# Patient Record
Sex: MALE | Race: WHITE | Employment: OTHER | URBAN - METROPOLITAN AREA
[De-identification: names, ages, dates, MRNs, and addresses within clinical notes are randomized per-mention and may not be internally consistent; named-entity substitution may affect disease eponyms.]

---

## 2019-07-09 ENCOUNTER — OFFICE VISIT (OUTPATIENT)
Dept: URGENT CARE | Facility: URGENT CARE | Age: 84
End: 2019-07-09
Payer: COMMERCIAL

## 2019-07-09 ENCOUNTER — ANCILLARY PROCEDURE (OUTPATIENT)
Dept: GENERAL RADIOLOGY | Facility: CLINIC | Age: 84
End: 2019-07-09
Attending: INTERNAL MEDICINE
Payer: COMMERCIAL

## 2019-07-09 VITALS
DIASTOLIC BLOOD PRESSURE: 85 MMHG | OXYGEN SATURATION: 94 % | WEIGHT: 195.2 LBS | SYSTOLIC BLOOD PRESSURE: 161 MMHG | HEART RATE: 77 BPM | TEMPERATURE: 97.8 F

## 2019-07-09 DIAGNOSIS — R06.02 SOB (SHORTNESS OF BREATH): ICD-10-CM

## 2019-07-09 DIAGNOSIS — R06.02 SOB (SHORTNESS OF BREATH): Primary | ICD-10-CM

## 2019-07-09 LAB
ANION GAP SERPL CALCULATED.3IONS-SCNC: 6 MMOL/L (ref 3–14)
BUN SERPL-MCNC: 15 MG/DL (ref 7–30)
CALCIUM SERPL-MCNC: 9.1 MG/DL (ref 8.5–10.1)
CHLORIDE SERPL-SCNC: 100 MMOL/L (ref 94–109)
CO2 SERPL-SCNC: 28 MMOL/L (ref 20–32)
CREAT SERPL-MCNC: 0.85 MG/DL (ref 0.66–1.25)
GFR SERPL CREATININE-BSD FRML MDRD: 81 ML/MIN/{1.73_M2}
GLUCOSE SERPL-MCNC: 93 MG/DL (ref 70–99)
HGB BLD-MCNC: 14.8 G/DL (ref 13.3–17.7)
POTASSIUM SERPL-SCNC: 4.2 MMOL/L (ref 3.4–5.3)
SODIUM SERPL-SCNC: 134 MMOL/L (ref 133–144)

## 2019-07-09 PROCEDURE — 93000 ELECTROCARDIOGRAM COMPLETE: CPT | Performed by: INTERNAL MEDICINE

## 2019-07-09 PROCEDURE — 99203 OFFICE O/P NEW LOW 30 MIN: CPT | Performed by: INTERNAL MEDICINE

## 2019-07-09 PROCEDURE — 36415 COLL VENOUS BLD VENIPUNCTURE: CPT | Performed by: INTERNAL MEDICINE

## 2019-07-09 PROCEDURE — 85018 HEMOGLOBIN: CPT | Performed by: INTERNAL MEDICINE

## 2019-07-09 PROCEDURE — 80048 BASIC METABOLIC PNL TOTAL CA: CPT | Performed by: INTERNAL MEDICINE

## 2019-07-09 PROCEDURE — 71046 X-RAY EXAM CHEST 2 VIEWS: CPT

## 2019-07-09 NOTE — PROGRESS NOTES
"SUBJECTIVE:  Eduardo Cowan, a 83 year old male, presents for evaluation of shortness of breath.  Duration of symptoms: 3 week(s).  This has been relatively stable over that period of time.  He has a history of hereditary peripheral neuropathy which has cuased some disability with walking and gait.  He states that his functional status has also been relatively stable over the past several months.  Just stating that he \"tires out\" more easily.  If there is a change in exercise capacity, it has been gradual and small over the past 6 months or so. He is describing also some right-sided chest discomfort which seems to be related to moving the right upper extremity. This is mild. Denies palpitations.  Just a little chronic cough.  Denies stridor or wheeze.  Also describing some vague right lower abdominal discomfort which was self-resolving.    ROS: positive for some dysphagia (pharyngeal); positive for diffuse arthralgias and arthritis.    SOCH: PCP through [facility] in [facility]     PMH: states that he recently had MRI abdomen (only finding was a benign liver lesion).   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 in terms of lower extremity function in particular; HTN    ROS:  The following systems have been completely reviewed and are negative except as noted in the HPI: CONSTITUTIONAL, HEAD AND NECK, CARDIOVASCULAR, PULMONARY, GASTROINTESTINAL, RENAL, HEMATOLOGIC, MUSCULOSKELETAL and NEUROLOGIC    OBJECTIVE:  /85 (BP Location: Left arm, Patient Position: Chair, Cuff Size: Adult Regular)   Pulse 77   Temp 97.8  F (36.6  C) (Tympanic)   Wt 88.5 kg (195 lb 3.2 oz)   SpO2 94%   GENERAL: alert and interactive elderly male; speaking full sentences; no overt respiratory distress  HENT: ear canals and TM's normal and nose and mouth without ulcers or lesions  NECK: no adenopathy, no asymmetry, masses, or scars and thyroid normal to palpation  RESP: clear to auscultation and " percussion bilaterally; diminished fremitus in the lung bases bilaterally  CV: regular rates and rhythm, normal S1 S2, no S3 or S4 and no murmur, click or rub -  ABDOMEN: soft, nontender, without hepatosplenomegaly or masses and bowel sounds normal  EXT: no cyanosis, clubbing or edema; peripheral pulses are brisk and symmetric in the radials, dorsalis pedis and posterior tibials bilaterally     CXR, which I have personally reviewed and interpreted, shows an elevated right hemidiaphragm of unknown chronicity.  No infiltrate, effusion, mediastinal mass.  Normal cardiac silhouette.    EKG, which I have personally reviewed and interpreted, shows a first degree AV block and RBBB of unknown chronicity.  This is different from EKG obtained > 15 years ago in 2003.    LAB: Normal BMP and Hgb    ASSESSMENT/PLAN:    ICD-10-CM    1. SOB (shortness of breath) R06.02 XR Chest 2 Views     EKG 12-lead complete w/read - Clinics     Basic metabolic panel  (Ca, Cl, CO2, Creat, Gluc, K, Na, BUN)     Hemoglobin    Considered extensive differential diagnosis with focus on excluding acute and imminently threatening causes of dyspnea such as cardiac arrhythmia, myocardial ischemia, CHF, pulmonary embolus, obstructive lung disease, pneumonia, acute neuromuscular respiratory failure.  None of the above are suggested by the history and the supplemental imaging, lab and EKG are all reassuring.  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  The elevated right hemidiaphragm on CXR may go along with this.  At the present time, he is referred back to his PCP for further evaluation.  Recommended investigations include looking at historic thoracic imaging (if any) to exclude mediastinal processes that could be impairing the phrenic nerve and to look at pulmonary function testing to quantify any restrictive pulmonary physiology  that may be present.       Wally Alberts MD

## 2019-07-10 NOTE — PATIENT INSTRUCTIONS
I am confident that we have excluded acute and imminently threatening causes of your shortness of breath.  There are a few findings today that should be followed up with your PCP:  1. Right bundle branch block on the EKG: this is new relative to a tracing we had from 2003 but it could have happened years ago without you having any knowledge of it and is not clearly related to your symptoms.  I would want to compare to recent EKGs.  2. Elevated diaphragm on the right: again, not sure if this connects to your symptoms though it could.   Would want to look at recent chest imaging and potentially consider other tests.  3. The other test that may be helpful for your PCP to consider is a pulmonary function test that measures the function of your respiratory muscles and can help to explain your symptoms potentially as being related to the neuropathy affecting respiratory muscle function.

## 2020-07-31 ENCOUNTER — RECORDS - HEALTHEAST (OUTPATIENT)
Dept: LAB | Facility: CLINIC | Age: 85
End: 2020-07-31

## 2020-08-10 LAB
SARS-COV-2 BY NAA - HISTORICAL: NOT DETECTED
SARS-COV-2 SOURCE - HISTORICAL: NORMAL

## 2020-08-24 ENCOUNTER — RECORDS - HEALTHEAST (OUTPATIENT)
Dept: LAB | Facility: CLINIC | Age: 85
End: 2020-08-24

## 2020-08-28 LAB
SARS-COV-2 BY NAA - HISTORICAL: NOT DETECTED
SARS-COV-2 SOURCE - HISTORICAL: NORMAL

## 2020-09-04 ENCOUNTER — RECORDS - HEALTHEAST (OUTPATIENT)
Dept: LAB | Facility: CLINIC | Age: 85
End: 2020-09-04

## 2020-09-11 LAB
SARS-COV-2 BY NAA - HISTORICAL: NOT DETECTED
SARS-COV-2 SOURCE - HISTORICAL: NORMAL

## 2020-12-30 ENCOUNTER — RECORDS - HEALTHEAST (OUTPATIENT)
Dept: LAB | Facility: CLINIC | Age: 85
End: 2020-12-30

## 2020-12-30 LAB
SARS-COV-2 PCR COMMENT: NORMAL
SARS-COV-2 RNA SPEC QL NAA+PROBE: NEGATIVE
SARS-COV-2 VIRUS SPECIMEN SOURCE: NORMAL

## 2021-01-06 ENCOUNTER — RECORDS - HEALTHEAST (OUTPATIENT)
Dept: LAB | Facility: CLINIC | Age: 86
End: 2021-01-06

## 2021-02-03 ENCOUNTER — RECORDS - HEALTHEAST (OUTPATIENT)
Dept: LAB | Facility: CLINIC | Age: 86
End: 2021-02-03

## 2021-10-31 ENCOUNTER — LAB REQUISITION (OUTPATIENT)
Dept: LAB | Facility: CLINIC | Age: 86
End: 2021-10-31
Payer: COMMERCIAL

## 2021-10-31 DIAGNOSIS — E11.9 TYPE 2 DIABETES MELLITUS WITHOUT COMPLICATIONS (H): ICD-10-CM

## 2021-10-31 DIAGNOSIS — G31.84 MILD COGNITIVE IMPAIRMENT, SO STATED: ICD-10-CM

## 2021-10-31 DIAGNOSIS — Z98.890 OTHER SPECIFIED POSTPROCEDURAL STATES: ICD-10-CM

## 2021-10-31 DIAGNOSIS — E78.5 HYPERLIPIDEMIA, UNSPECIFIED: ICD-10-CM

## 2021-10-31 DIAGNOSIS — E03.9 HYPOTHYROIDISM, UNSPECIFIED: ICD-10-CM

## 2021-10-31 DIAGNOSIS — D47.2 MONOCLONAL GAMMOPATHY: ICD-10-CM

## 2021-10-31 DIAGNOSIS — G62.9 POLYNEUROPATHY, UNSPECIFIED: ICD-10-CM

## 2021-10-31 DIAGNOSIS — F32.4 MAJOR DEPRESSIVE DISORDER, SINGLE EPISODE, IN PARTIAL REMISSION (H): ICD-10-CM

## 2021-10-31 DIAGNOSIS — I10 ESSENTIAL (PRIMARY) HYPERTENSION: ICD-10-CM

## 2021-11-02 LAB
ALBUMIN SERPL-MCNC: 3.7 G/DL (ref 3.5–5)
ALP SERPL-CCNC: 64 U/L (ref 45–120)
ALT SERPL W P-5'-P-CCNC: 60 U/L (ref 0–45)
ANION GAP SERPL CALCULATED.3IONS-SCNC: 8 MMOL/L (ref 5–18)
AST SERPL W P-5'-P-CCNC: 50 U/L (ref 0–40)
BILIRUB SERPL-MCNC: 0.7 MG/DL (ref 0–1)
BUN SERPL-MCNC: 29 MG/DL (ref 8–28)
CALCIUM SERPL-MCNC: 10 MG/DL (ref 8.5–10.5)
CHLORIDE BLD-SCNC: 103 MMOL/L (ref 98–107)
CHOLEST SERPL-MCNC: 116 MG/DL
CO2 SERPL-SCNC: 28 MMOL/L (ref 22–31)
CREAT SERPL-MCNC: 1.01 MG/DL (ref 0.7–1.3)
ERYTHROCYTE [DISTWIDTH] IN BLOOD BY AUTOMATED COUNT: 14.6 % (ref 10–15)
FASTING STATUS PATIENT QL REPORTED: ABNORMAL
FOLATE SERPL-MCNC: 5.9 NG/ML
GFR SERPL CREATININE-BSD FRML MDRD: 68 ML/MIN/1.73M2
GLUCOSE BLD-MCNC: 112 MG/DL (ref 70–125)
HCT VFR BLD AUTO: 41.9 % (ref 40–53)
HDLC SERPL-MCNC: 35 MG/DL
HGB BLD-MCNC: 13.3 G/DL (ref 13.3–17.7)
LDLC SERPL CALC-MCNC: 40 MG/DL
MCH RBC QN AUTO: 29.4 PG (ref 26.5–33)
MCHC RBC AUTO-ENTMCNC: 31.7 G/DL (ref 31.5–36.5)
MCV RBC AUTO: 93 FL (ref 78–100)
PLATELET # BLD AUTO: 112 10E3/UL (ref 150–450)
POTASSIUM BLD-SCNC: 4.3 MMOL/L (ref 3.5–5)
PROT SERPL-MCNC: 8.4 G/DL (ref 6–8)
PSA SERPL-MCNC: 0.46 UG/L (ref 0–6.5)
RBC # BLD AUTO: 4.52 10E6/UL (ref 4.4–5.9)
SODIUM SERPL-SCNC: 139 MMOL/L (ref 136–145)
TRIGL SERPL-MCNC: 205 MG/DL
TSH SERPL DL<=0.005 MIU/L-ACNC: 1.93 UIU/ML (ref 0.3–5)
VIT B12 SERPL-MCNC: 193 PG/ML (ref 213–816)
WBC # BLD AUTO: 3.4 10E3/UL (ref 4–11)

## 2021-11-02 PROCEDURE — 36415 COLL VENOUS BLD VENIPUNCTURE: CPT | Mod: ORL | Performed by: FAMILY MEDICINE

## 2021-11-02 PROCEDURE — 80061 LIPID PANEL: CPT | Mod: ORL | Performed by: FAMILY MEDICINE

## 2021-11-02 PROCEDURE — 84165 PROTEIN E-PHORESIS SERUM: CPT | Mod: 26 | Performed by: PATHOLOGY

## 2021-11-02 PROCEDURE — G0103 PSA SCREENING: HCPCS | Mod: ORL | Performed by: FAMILY MEDICINE

## 2021-11-02 PROCEDURE — P9603 ONE-WAY ALLOW PRORATED MILES: HCPCS | Mod: ORL | Performed by: FAMILY MEDICINE

## 2021-11-02 PROCEDURE — 82607 VITAMIN B-12: CPT | Mod: ORL | Performed by: FAMILY MEDICINE

## 2021-11-02 PROCEDURE — 84443 ASSAY THYROID STIM HORMONE: CPT | Mod: ORL | Performed by: FAMILY MEDICINE

## 2021-11-02 PROCEDURE — 82746 ASSAY OF FOLIC ACID SERUM: CPT | Mod: ORL | Performed by: FAMILY MEDICINE

## 2021-11-02 PROCEDURE — 82306 VITAMIN D 25 HYDROXY: CPT | Mod: ORL | Performed by: FAMILY MEDICINE

## 2021-11-02 PROCEDURE — 80053 COMPREHEN METABOLIC PANEL: CPT | Mod: ORL | Performed by: FAMILY MEDICINE

## 2021-11-02 PROCEDURE — 84165 PROTEIN E-PHORESIS SERUM: CPT | Mod: TC,ORL | Performed by: FAMILY MEDICINE

## 2021-11-02 PROCEDURE — 85027 COMPLETE CBC AUTOMATED: CPT | Mod: ORL | Performed by: FAMILY MEDICINE

## 2021-11-03 LAB
DEPRECATED CALCIDIOL+CALCIFEROL SERPL-MC: 17 UG/L (ref 30–80)
TOTAL PROTEIN SERUM FOR ELP: 8.4 G/DL (ref 6–8)

## 2021-11-04 LAB
ALBUMIN PERCENT: 55.1 % (ref 51–67)
ALBUMIN SERPL ELPH-MCNC: 4.6 G/DL (ref 3.2–4.7)
ALPHA 1 PERCENT: 2.3 % (ref 2–4)
ALPHA 2 PERCENT: 9.9 % (ref 5–13)
ALPHA1 GLOB SERPL ELPH-MCNC: 0.2 G/DL (ref 0.1–0.3)
ALPHA2 GLOB SERPL ELPH-MCNC: 0.8 G/DL (ref 0.4–0.9)
B-GLOBULIN SERPL ELPH-MCNC: 0.9 G/DL (ref 0.7–1.2)
BETA PERCENT: 10.2 % (ref 10–17)
GAMMA GLOB SERPL ELPH-MCNC: 1.9 G/DL (ref 0.6–1.4)
GAMMA GLOBULIN PERCENT: 22.5 % (ref 9–20)
MONOCLONAL PEAK: 1.5 G/DL
PATH ICD:: ABNORMAL
PROT PATTERN SERPL ELPH-IMP: ABNORMAL
REVIEWING PATHOLOGIST: ABNORMAL
TOTAL PROTEIN SERUM FOR ELP (SYNCED VALUE): 8.4 G/DL

## 2022-02-28 ENCOUNTER — DOCUMENTATION ONLY (OUTPATIENT)
Dept: OTHER | Facility: CLINIC | Age: 87
End: 2022-02-28
Payer: COMMERCIAL

## 2022-03-01 ENCOUNTER — PATIENT OUTREACH (OUTPATIENT)
Dept: GERIATRIC MEDICINE | Facility: CLINIC | Age: 87
End: 2022-03-01

## 2022-03-10 ENCOUNTER — DOCUMENTATION ONLY (OUTPATIENT)
Dept: GERIATRICS | Facility: CLINIC | Age: 87
End: 2022-03-10
Payer: COMMERCIAL

## 2022-03-10 DIAGNOSIS — G47.00 INSOMNIA: ICD-10-CM

## 2022-03-10 DIAGNOSIS — E55.9 VITAMIN D DEFICIENCY: ICD-10-CM

## 2022-03-10 DIAGNOSIS — E53.8 VITAMIN B12 DEFICIENCY (NON ANEMIC): ICD-10-CM

## 2022-03-10 DIAGNOSIS — F32.9 MAJOR DEPRESSION: ICD-10-CM

## 2022-03-10 DIAGNOSIS — K59.00 CONSTIPATION: ICD-10-CM

## 2022-03-10 DIAGNOSIS — E03.9 HYPOTHYROIDISM: ICD-10-CM

## 2022-03-10 DIAGNOSIS — E78.5 HYPERLIPIDEMIA: ICD-10-CM

## 2022-03-10 DIAGNOSIS — R52 PAIN: ICD-10-CM

## 2022-03-10 DIAGNOSIS — I10 ESSENTIAL HYPERTENSION: Primary | ICD-10-CM

## 2022-03-10 RX ORDER — PREGABALIN 200 MG/1
200 CAPSULE ORAL 3 TIMES DAILY
COMMUNITY
End: 2022-07-15

## 2022-03-10 RX ORDER — LANOLIN ALCOHOL/MO/W.PET/CERES
1000 CREAM (GRAM) TOPICAL DAILY
Start: 2022-03-10 | End: 2022-05-16

## 2022-03-10 RX ORDER — LANOLIN ALCOHOL/MO/W.PET/CERES
3 CREAM (GRAM) TOPICAL AT BEDTIME
Start: 2022-03-10

## 2022-03-10 RX ORDER — LOSARTAN POTASSIUM 25 MG/1
25 TABLET ORAL DAILY
Start: 2022-03-10

## 2022-03-10 RX ORDER — SERTRALINE HYDROCHLORIDE 100 MG/1
100 TABLET, FILM COATED ORAL DAILY
Start: 2022-03-10 | End: 2022-06-13

## 2022-03-10 RX ORDER — SIMVASTATIN 40 MG
40 TABLET ORAL AT BEDTIME
Start: 2022-03-10

## 2022-03-10 RX ORDER — ASPIRIN 81 MG/1
81 TABLET, CHEWABLE ORAL DAILY
Start: 2022-03-10

## 2022-03-10 RX ORDER — ACETAMINOPHEN 500 MG
1000 TABLET ORAL 2 TIMES DAILY
Start: 2022-03-10 | End: 2022-04-25

## 2022-03-10 RX ORDER — LEVOTHYROXINE SODIUM 75 UG/1
75 TABLET ORAL DAILY
Start: 2022-03-10

## 2022-03-10 RX ORDER — POLYETHYLENE GLYCOL 3350 17 G/17G
1 POWDER, FOR SOLUTION ORAL DAILY PRN
Start: 2022-03-10

## 2022-03-15 VITALS
HEART RATE: 78 BPM | RESPIRATION RATE: 18 BRPM | WEIGHT: 183.6 LBS | TEMPERATURE: 97.6 F | DIASTOLIC BLOOD PRESSURE: 86 MMHG | SYSTOLIC BLOOD PRESSURE: 144 MMHG

## 2022-03-16 ENCOUNTER — ASSISTED LIVING VISIT (OUTPATIENT)
Dept: GERIATRICS | Facility: CLINIC | Age: 87
End: 2022-03-16
Payer: COMMERCIAL

## 2022-03-16 DIAGNOSIS — E78.5 HYPERLIPIDEMIA, UNSPECIFIED HYPERLIPIDEMIA TYPE: ICD-10-CM

## 2022-03-16 DIAGNOSIS — Z71.89 ADVANCED DIRECTIVES, COUNSELING/DISCUSSION: ICD-10-CM

## 2022-03-16 DIAGNOSIS — K21.9 GASTROESOPHAGEAL REFLUX DISEASE WITHOUT ESOPHAGITIS: ICD-10-CM

## 2022-03-16 DIAGNOSIS — F32.A DEPRESSION, UNSPECIFIED DEPRESSION TYPE: ICD-10-CM

## 2022-03-16 DIAGNOSIS — E53.8 VITAMIN B12 DEFICIENCY (NON ANEMIC): ICD-10-CM

## 2022-03-16 DIAGNOSIS — L85.3 DRY SKIN: ICD-10-CM

## 2022-03-16 DIAGNOSIS — Z85.820 HISTORY OF MELANOMA: ICD-10-CM

## 2022-03-16 DIAGNOSIS — E11.51 TYPE 2 DIABETES MELLITUS WITH PERIPHERAL ANGIOPATHY (H): ICD-10-CM

## 2022-03-16 DIAGNOSIS — F03.90 DEMENTIA WITHOUT BEHAVIORAL DISTURBANCE, UNSPECIFIED DEMENTIA TYPE: Primary | ICD-10-CM

## 2022-03-16 DIAGNOSIS — M54.16 LUMBAR RADICULOPATHY: ICD-10-CM

## 2022-03-16 DIAGNOSIS — M15.9 OSTEOARTHRITIS OF MULTIPLE JOINTS, UNSPECIFIED OSTEOARTHRITIS TYPE: ICD-10-CM

## 2022-03-16 DIAGNOSIS — D47.2 MGUS (MONOCLONAL GAMMOPATHY OF UNKNOWN SIGNIFICANCE): ICD-10-CM

## 2022-03-16 DIAGNOSIS — I10 PRIMARY HYPERTENSION: ICD-10-CM

## 2022-03-16 DIAGNOSIS — E11.42 DIABETIC POLYNEUROPATHY ASSOCIATED WITH TYPE 2 DIABETES MELLITUS (H): ICD-10-CM

## 2022-03-16 DIAGNOSIS — K57.32 DIVERTICULITIS OF COLON: ICD-10-CM

## 2022-03-16 DIAGNOSIS — E55.9 VITAMIN D DEFICIENCY: ICD-10-CM

## 2022-03-16 DIAGNOSIS — E03.9 ACQUIRED HYPOTHYROIDISM: ICD-10-CM

## 2022-03-16 RX ORDER — LANOLIN ALCOHOL/MO/W.PET/CERES
CREAM (GRAM) TOPICAL
Qty: 480 ML | Refills: 11 | Status: SHIPPED | OUTPATIENT
Start: 2022-03-16

## 2022-03-16 NOTE — PROGRESS NOTES
"[facility] GERIATRICS    PRIMARY CARE PROVIDER AND CLINIC:  Kali Cronin, APRN CNP, [address] / [place] [address]  Chief Complaint   Patient presents with     [facility] Medical Record Number:  [id number]  Place of Service where encounter took place:  [facility] ([facility] [269]    Eduardo Cowan  is a 86 year old  (1935), living in above facility since 6/21/2019 and now choosing to change PCPs to [facility].  Previously followed by [facility] and their records are not available.     HPI:    He has a medical history significant for mild dementia, diabetes type 2 with neuropathy, HTN, lumbar radiculopathy, osteoarthritis, GERD, depression.   He lives in Memory Care due to high level physical needs. Nonambulatory for ~ 5 yrs and uses an electric scooter. Arabella is used for transfers or he pivots with max assist. Requires assist of 1 with cares. His grandson is present today.     He reports feeling well. Reports mild congestion with occasional dry cough for the past month. No shortness of breath and does not feel ill. Reports \"constant\" pain from left sciatica and neuropathy of both feet. Nurses are concerned about pressure areas on his feet and toes. Skin intact. Mood is good, sleeping well.     CODE STATUS/ADVANCE DIRECTIVES DISCUSSION:  No CPR- Do NOT Intubate  DNR / DNI   ALLERGIES:   Allergies   Allergen Reactions     Amlodipine Besylate      swelling of feet      PAST MEDICAL HISTORY:   Past Medical History:   Diagnosis Date     Acquired hypothyroidism      Acute duodenal ulcer with hemorrhage, without mention of obstruction 01/01/1982    Bleeding duodenal ulcer     Cataract      Diverticulitis of colon      Elevated prostate specific antigen (PSA)     Has had benign biopsies.  Was 10.8 in 2-02.     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  FAMILY HISTORY: family history includes Cancer in his mother.  SOCIAL HISTORY:   reports that he quit smoking about 55 years ago. His smoking use included cigarettes. He has never used smokeless tobacco. He reports previous alcohol use.  Patient's living condition: lives in an assisted living facility.  for 3 yrs. 1 son and 1 daughter.     Post Discharge Medication Reconciliation Status: discharge medications reconciled, continue medications without change  Current Outpatient Medications   Medication Sig     acetaminophen (TYLENOL) 500 MG tablet Take 2 tablets (1,000 mg) by mouth 2 times daily     aspirin (ASA) 81 MG chewable tablet Take 1 tablet (81 mg) by mouth daily     cyanocobalamin (VITAMIN B-12) 1000 MCG tablet Take 1 tablet (1,000 mcg) by mouth daily     Eucerin external lotion To both LE and feet at HS and daily prn dry skin     levothyroxine (SYNTHROID/LEVOTHROID) 75 MCG tablet Take 1 tablet (75 mcg) by mouth daily     losartan (COZAAR) 25 MG tablet Take 1 tablet (25 mg) by mouth daily     melatonin 3 MG tablet Take 1 tablet (3 mg) by mouth At Bedtime     polyethylene glycol (MIRALAX) 17 GM/Dose powder Take 17 g (1 capful) by mouth daily as needed for constipation     Pregabalin (LYRICA) 200 MG capsule Take 200 mg by mouth 3 times daily     sertraline (ZOLOFT) 100 MG tablet Take 1 tablet (100 mg) by mouth daily      simvastatin (ZOCOR) 40 MG tablet Take 1 tablet (40 mg) by mouth At Bedtime     Vitamin D3 (CHOLECALCIFEROL) 125 MCG (5000 UT) tablet Take 1 tablet (125 mcg) by mouth daily     No current facility-administered medications for this visit.       ROS:  10 point ROS of systems including Constitutional, Eyes, Respiratory, Cardiovascular, Gastroenterology, Genitourinary, Integumentary, Musculoskeletal, Psychiatric were all negative except for pertinent positives noted in my HPI.    Vitals:  BP (!) 144/86   Pulse 78   Temp 97.6  F (36.4  C)   Resp 18   Wt 83.3 kg (183 lb 9.6 oz)   Exam:  GENERAL APPEARANCE:  Alert, in no distress  ENT:  [place], oropharynx clear  EYES:  EOM normal, conjunctiva and lids normal  NECK: no adenopathy,masses or thyromegaly  RESP:  lungs clear to auscultation , no respiratory distress  CV:  regular rate and rhythm, no murmur,  +2 pedal pulses, peripheral edema trace in both LE  ABDOMEN:  soft, non-tender, no distension, no masses  M/S:   up in recliner. Mild weakness both LE. Good upper body strength. No joint inflammation  SKIN:  dry, cracked skin of both feet and LE. Erythematous areas over toes of both feet, no open areas  PSYCH:  oriented X 3, memory impaired , affect and mood normal    Lab/Diagnostic data:  Recent labs in [facility] reviewed by me today.     ASSESSMENT / PLAN:  (F03.90) Dementia without behavioral disturbance, unspecified dementia type (H)  (primary encounter diagnosis)  Comment: noted in records 9/2021. Appears to have mild deficits. Conversation is appropriate. Poor functional status due to comorbidities   Plan: Memory Care staff assistance with cares, meals,activities and med admin.     (E11.51) Type 2 diabetes mellitus with peripheral angiopathy (H)  (E11.42) Diabetic polyneuropathy associated with type 2 diabetes mellitus (H)  (L85.3) Dry skin  Comment: diet controlled with HgbA1c 6.5 on 8/6/2021.Skin both LE very dry. Pressure areas of toes appear to be from his slippers  He  was evaluated at [place] for neuropathy, felt to be genetic and/or  idiopathic.   Plan: continue Lyrica for neuropathy. Eucerin to feet and LE bid and prn. Closely monitor skin for breakdown as wounds will be difficult to heal. He will talk to family about getting new slippers. Monitor blood glucose prn, follow HgbA1c.     (I10) Primary hypertension  Comment: controlled with recent  BPs: 144/86, 128/74, 115/69, 157/96  HR in the 70-80s   Creatinine 1.01 with GFR 68  Plan: continue losartan 25 mg daily. Monitor VS and adjust dose as indicated. BMP    (M54.16) Lumbar radiculopathy  (M15.9) Osteoarthritis of multiple joints, unspecified osteoarthritis type  Comment: pain fairly well managed. Nonambulatory  Plan: continue tylenol, Lyrica for neuropathy     (K21.9) Gastroesophageal reflux disease without esophagitis  Comment: not currently requiring treatment   Plan: follow symptoms     (K57.32) Diverticulitis of colon  Comment: no acute issues  Plan: miralax prn     (E03.9) Acquired hypothyroidism  Comment: TSH 1.93 on 11/2/2021  Plan: continue levothyroxine 75 mcg daily. Yearly TSH    (F32.A) Depression, unspecified depression type  Comment: pleasant and talkative, appears in good spirits   Plan: continue sertraline and melatonin     (E55.9) Vitamin D deficiency  Comment: vitamin D level 17 on 11/2/2021  Plan: continue 5000 units daily and recheck level    (E53.8) Vitamin B12 deficiency (non anemic)  Comment: vitamin B12 level low at 193 on 11/2/2021. Hgb 13.3   Plan: continue B12 supplement. Recheck level.     Hyperlipidemia  Comment: lipid panel 11/2/2021: triglycerides 205, LDL 40, HDL 35  Plan: continue simvastatin     MGUS  Comment: noted as far back as 2004. SPEP done 11/2/2021  Plan: follow labs     (Z85.820) History of melanoma  Comment: of RLE 10/2020.  No suspicious lesions  Plan: monitor. Oncology follow up prn     (Z71.89) Advanced directives, counseling/discussion  Comment: he confirms DNR/DNI   Plan: epic  orders updated         Total time spent with patient visit at the AL facility was 50 min including patient visit and review of past records. Greater than 50% of total time spent with counseling and coordinating care due to establishing primary care. Counseling patient re: medications and potential side effect, follow up labs, plan of care and advanced directive. Coordinating the following with facility staff: medication orders, labs, skin care.     Electronically signed by:  REENA Loaiza CNP

## 2022-03-16 NOTE — LETTER
"    3/16/2022        RE: Eduardo Cowan  [facility]  [address] [address]        [facility]IATR[facility]    PRIMARY CARE PROVIDER AND CLINIC:  REENA Loaiza CNP, [address] / [place] [address]  Chief Complaint   Patient presents with     [facility] Medical Record Number:  [id number]  Place of Service where encounter took place:  [facility] ([facility] [269]    Eduardo Cowan  is a 86 year old  (1935), living in above facility since 6/21/2019 and now choosing to change PCPs to [facility].  Previously followed by [facility] and their records are not available.     HPI:    He has a medical history significant for mild dementia, diabetes type 2 with neuropathy, HTN, lumbar radiculopathy, osteoarthritis, GERD, depression.   He lives in Memory Care due to high level physical needs. Nonambulatory for ~ 5 yrs and uses an electric scooter. Arabella is used for transfers or he pivots with max assist. Requires assist of 1 with cares. His grandson is present today.     He reports feeling well. Reports mild congestion with occasional dry cough for the past month. No shortness of breath and does not feel ill. Reports \"constant\" pain from left sciatica and neuropathy of both feet. Nurses are concerned about pressure areas on his feet and toes. Skin intact. Mood is good, sleeping well.     CODE STATUS/ADVANCE DIRECTIVES DISCUSSION:  No CPR- Do NOT Intubate  DNR / DNI   ALLERGIES:   Allergies   Allergen Reactions     Amlodipine Besylate      swelling of feet      PAST MEDICAL HISTORY:   Past Medical History:   Diagnosis Date     Acquired hypothyroidism      Acute duodenal ulcer with hemorrhage, without mention of obstruction 01/01/1982    Bleeding duodenal ulcer     Cataract      Diverticulitis of colon      Elevated prostate specific antigen (PSA)     Has had benign biopsies.  Was 10.8 in 2-02.     Gastroesophageal reflux " disease without esophagitis      Hypertrophy (benign) of prostate      Lumbar radiculopathy      Major depressive disorder with single episode, in partial remission (H)      Melanoma of skin (H)      Mixed hyperlipidemia      Osteoarthritis of multiple joints      Primary hypertension      Tubular adenoma of colon      Type 2 diabetes mellitus with diabetic polyneuropathy, without long-term current use of insulin (H)      Unspecified essential hypertension      Unspecified hereditary and idiopathic peripheral neuropathy       PAST SURGICAL HISTORY:   has a past surgical history that includes NONSPECIFIC PROCEDURE; NONSPECIFIC PROCEDURE (2001); NONSPECIFIC PROCEDURE (1990); and NONSPECIFIC PROCEDURE.  FAMILY HISTORY: family history includes Cancer in his mother.  SOCIAL HISTORY:   reports that he quit smoking about 55 years ago. His smoking use included cigarettes. He has never used smokeless tobacco. He reports previous alcohol use.  Patient's living condition: lives in an assisted living facility.  for 3 yrs. 1 son and 1 daughter.     Post Discharge Medication Reconciliation Status: discharge medications reconciled, continue medications without change  Current Outpatient Medications   Medication Sig     acetaminophen (TYLENOL) 500 MG tablet Take 2 tablets (1,000 mg) by mouth 2 times daily     aspirin (ASA) 81 MG chewable tablet Take 1 tablet (81 mg) by mouth daily     cyanocobalamin (VITAMIN B-12) 1000 MCG tablet Take 1 tablet (1,000 mcg) by mouth daily     Eucerin external lotion To both LE and feet at HS and daily prn dry skin     levothyroxine (SYNTHROID/LEVOTHROID) 75 MCG tablet Take 1 tablet (75 mcg) by mouth daily     losartan (COZAAR) 25 MG tablet Take 1 tablet (25 mg) by mouth daily     melatonin 3 MG tablet Take 1 tablet (3 mg) by mouth At Bedtime     polyethylene glycol (MIRALAX) 17 GM/Dose powder Take 17 g (1 capful) by mouth daily as needed for constipation     Pregabalin (LYRICA) 200 MG capsule  Take 200 mg by mouth 3 times daily     sertraline (ZOLOFT) 100 MG tablet Take 1 tablet (100 mg) by mouth daily     simvastatin (ZOCOR) 40 MG tablet Take 1 tablet (40 mg) by mouth At Bedtime     Vitamin D3 (CHOLECALCIFEROL) 125 MCG (5000 UT) tablet Take 1 tablet (125 mcg) by mouth daily     No current facility-administered medications for this visit.       ROS:  10 point ROS of systems including Constitutional, Eyes, Respiratory, Cardiovascular, Gastroenterology, Genitourinary, Integumentary, Musculoskeletal, Psychiatric were all negative except for pertinent positives noted in my HPI.    Vitals:  BP (!) 144/86   Pulse 78   Temp 97.6  F (36.4  C)   Resp 18   Wt 83.3 kg (183 lb 9.6 oz)   Exam:  GENERAL APPEARANCE:  Alert, in no distress  ENT:  [place], oropharynx clear  EYES:  EOM normal, conjunctiva and lids normal  NECK: no adenopathy,masses or thyromegaly  RESP:  lungs clear to auscultation , no respiratory distress  CV:  regular rate and rhythm, no murmur,  +2 pedal pulses, peripheral edema trace in both LE  ABDOMEN:  soft, non-tender, no distension, no masses  M/S:   up in recliner. Mild weakness both LE. Good upper body strength. No joint inflammation  SKIN:  dry, cracked skin of both feet and LE. Erythematous areas over toes of both feet, no open areas  PSYCH:  oriented X 3, memory impaired , affect and mood normal    Lab/Diagnostic data:  Recent labs in [facility] reviewed by me today.     ASSESSMENT / PLAN:  (F03.90) Dementia without behavioral disturbance, unspecified dementia type (H)  (primary encounter diagnosis)  Comment: noted in records 9/2021. Appears to have mild deficits. Conversation is appropriate. Poor functional status due to comorbidities   Plan: Memory Care staff assistance with cares, meals,activities and med admin.     (E11.51) Type 2 diabetes mellitus with peripheral angiopathy (H)  (E11.42) Diabetic polyneuropathy associated with type 2 diabetes mellitus (H)  (L85.3) Dry skin  Comment: diet  controlled with HgbA1c 6.5 on 8/6/2021.Skin both LE very dry. Pressure areas of toes appear to be from his slippers  He was evaluated at [place] for neuropathy, felt to be genetic and/or  idiopathic.   Plan: continue Lyrica for neuropathy. Eucerin to feet and LE bid and prn. Closely monitor skin for breakdown as wounds will be difficult to heal. He will talk to family about getting new slippers. Monitor blood glucose prn, follow HgbA1c.     (I10) Primary hypertension  Comment: controlled with recent  BPs: 144/86, 128/74, 115/69, 157/96  HR in the 70-80s   Creatinine 1.01 with GFR 68  Plan: continue losartan 25 mg daily. Monitor VS and adjust dose as indicated. BMP    (M54.16) Lumbar radiculopathy  (M15.9) Osteoarthritis of multiple joints, unspecified osteoarthritis type  Comment: pain fairly well managed. Nonambulatory  Plan: continue tylenol, Lyrica for neuropathy     (K21.9) Gastroesophageal reflux disease without esophagitis  Comment: not currently requiring treatment   Plan: follow symptoms     (K57.32) Diverticulitis of colon  Comment: no acute issues  Plan: miralax prn     (E03.9) Acquired hypothyroidism  Comment: TSH 1.93 on 11/2/2021  Plan: continue levothyroxine 75 mcg daily. Yearly TSH    (F32.A) Depression, unspecified depression type  Comment: pleasant and talkative, appears in good spirits   Plan: continue sertraline and melatonin     (E55.9) Vitamin D deficiency  Comment: vitamin D level 17 on 11/2/2021  Plan: continue 5000 units daily and recheck level    (E53.8) Vitamin B12 deficiency (non anemic)  Comment: vitamin B12 level low at 193 on 11/2/2021. Hgb 13.3   Plan: continue B12 supplement. Recheck level.     Hyperlipidemia  Comment: lipid panel 11/2/2021: triglycerides 205, LDL 40, HDL 35  Plan: continue simvastatin     MGUS  Comment: noted as far back as 2004. SPEP done 11/2/2021  Plan: follow labs     (Z85.820) History of melanoma  Comment: of RLE 10/2020.  No suspicious lesions  Plan: monitor.  Oncology follow up prn     (Z71.89) Advanced directives, counseling/discussion  Comment: he confirms DNR/DNI   Plan: epic orders updated         Total time spent with patient visit at the AL facility was 50 min including patient visit and review of past records. Greater than 50% of total time spent with counseling and coordinating care due to establishing primary care. Counseling patient re: medications and potential side effect, follow up labs, plan of care and advanced directive. Coordinating the following with facility staff: medication orders, labs, skin care.     Electronically signed by:  REENA Loazia CNP                       Sincerely,        REENA Loaiza CNP

## 2022-03-19 ENCOUNTER — LAB REQUISITION (OUTPATIENT)
Dept: LAB | Facility: CLINIC | Age: 87
End: 2022-03-19
Payer: COMMERCIAL

## 2022-03-19 DIAGNOSIS — E78.5 HYPERLIPIDEMIA, UNSPECIFIED: ICD-10-CM

## 2022-03-19 DIAGNOSIS — E53.9 VITAMIN B DEFICIENCY, UNSPECIFIED: ICD-10-CM

## 2022-03-19 DIAGNOSIS — E55.9 VITAMIN D DEFICIENCY, UNSPECIFIED: ICD-10-CM

## 2022-03-19 DIAGNOSIS — I10 ESSENTIAL (PRIMARY) HYPERTENSION: ICD-10-CM

## 2022-03-22 LAB
ALBUMIN SERPL-MCNC: 3.5 G/DL (ref 3.5–5)
ALP SERPL-CCNC: 44 U/L (ref 45–120)
ALT SERPL W P-5'-P-CCNC: 39 U/L (ref 0–45)
ANION GAP SERPL CALCULATED.3IONS-SCNC: 11 MMOL/L (ref 5–18)
AST SERPL W P-5'-P-CCNC: 31 U/L (ref 0–40)
BILIRUB SERPL-MCNC: 0.7 MG/DL (ref 0–1)
BUN SERPL-MCNC: 25 MG/DL (ref 8–28)
CALCIUM SERPL-MCNC: 9.7 MG/DL (ref 8.5–10.5)
CHLORIDE BLD-SCNC: 101 MMOL/L (ref 98–107)
CO2 SERPL-SCNC: 28 MMOL/L (ref 22–31)
CREAT SERPL-MCNC: 1.04 MG/DL (ref 0.7–1.3)
ERYTHROCYTE [DISTWIDTH] IN BLOOD BY AUTOMATED COUNT: 13.9 % (ref 10–15)
GFR SERPL CREATININE-BSD FRML MDRD: 70 ML/MIN/1.73M2
GLUCOSE BLD-MCNC: 174 MG/DL (ref 70–125)
HCT VFR BLD AUTO: 39.1 % (ref 40–53)
HGB BLD-MCNC: 12.4 G/DL (ref 13.3–17.7)
MCH RBC QN AUTO: 30 PG (ref 26.5–33)
MCHC RBC AUTO-ENTMCNC: 31.7 G/DL (ref 31.5–36.5)
MCV RBC AUTO: 94 FL (ref 78–100)
PLATELET # BLD AUTO: 117 10E3/UL (ref 150–450)
POTASSIUM BLD-SCNC: 4.2 MMOL/L (ref 3.5–5)
PROT SERPL-MCNC: 7.7 G/DL (ref 6–8)
RBC # BLD AUTO: 4.14 10E6/UL (ref 4.4–5.9)
SODIUM SERPL-SCNC: 140 MMOL/L (ref 136–145)
VIT B12 SERPL-MCNC: 490 PG/ML (ref 213–816)
WBC # BLD AUTO: 3.4 10E3/UL (ref 4–11)

## 2022-03-22 PROCEDURE — 82607 VITAMIN B-12: CPT | Mod: ORL | Performed by: NURSE PRACTITIONER

## 2022-03-22 PROCEDURE — 80053 COMPREHEN METABOLIC PANEL: CPT | Mod: ORL | Performed by: NURSE PRACTITIONER

## 2022-03-22 PROCEDURE — 85027 COMPLETE CBC AUTOMATED: CPT | Mod: ORL | Performed by: NURSE PRACTITIONER

## 2022-03-22 PROCEDURE — P9604 ONE-WAY ALLOW PRORATED TRIP: HCPCS | Mod: ORL | Performed by: NURSE PRACTITIONER

## 2022-03-22 PROCEDURE — 82306 VITAMIN D 25 HYDROXY: CPT | Mod: ORL | Performed by: NURSE PRACTITIONER

## 2022-03-22 PROCEDURE — 36415 COLL VENOUS BLD VENIPUNCTURE: CPT | Mod: ORL | Performed by: NURSE PRACTITIONER

## 2022-03-23 LAB — DEPRECATED CALCIDIOL+CALCIFEROL SERPL-MC: 56 UG/L (ref 20–75)

## 2022-03-31 PROBLEM — F32.4 MAJOR DEPRESSIVE DISORDER WITH SINGLE EPISODE, IN PARTIAL REMISSION (H): Status: ACTIVE | Noted: 2022-03-31

## 2022-03-31 PROBLEM — F03.90 DEMENTIA WITHOUT BEHAVIORAL DISTURBANCE, UNSPECIFIED DEMENTIA TYPE: Status: ACTIVE | Noted: 2022-03-31

## 2022-03-31 PROBLEM — M15.9 OSTEOARTHRITIS OF MULTIPLE JOINTS: Status: ACTIVE | Noted: 2022-03-31

## 2022-03-31 PROBLEM — E03.9 ACQUIRED HYPOTHYROIDISM: Status: ACTIVE | Noted: 2022-03-31

## 2022-03-31 PROBLEM — E11.42 TYPE 2 DIABETES MELLITUS WITH DIABETIC POLYNEUROPATHY, WITHOUT LONG-TERM CURRENT USE OF INSULIN (H): Status: ACTIVE | Noted: 2022-03-31

## 2022-03-31 PROBLEM — M54.16 LUMBAR RADICULOPATHY: Status: ACTIVE | Noted: 2022-03-31

## 2022-03-31 PROBLEM — E78.2 MIXED HYPERLIPIDEMIA: Status: ACTIVE | Noted: 2022-03-31

## 2022-03-31 PROBLEM — K57.32 DIVERTICULITIS OF COLON: Status: ACTIVE | Noted: 2022-03-31

## 2022-03-31 PROBLEM — K21.9 GASTROESOPHAGEAL REFLUX DISEASE WITHOUT ESOPHAGITIS: Status: ACTIVE | Noted: 2022-03-31

## 2022-03-31 PROBLEM — F32.4 MAJOR DEPRESSIVE DISORDER WITH SINGLE EPISODE, IN PARTIAL REMISSION (H): Status: RESOLVED | Noted: 2022-03-31 | Resolved: 2022-03-31

## 2022-03-31 PROBLEM — E11.42 TYPE 2 DIABETES MELLITUS WITH DIABETIC POLYNEUROPATHY, WITHOUT LONG-TERM CURRENT USE OF INSULIN (H): Status: RESOLVED | Noted: 2022-03-31 | Resolved: 2022-03-31

## 2022-04-01 PROBLEM — D03.71: Status: ACTIVE | Noted: 2022-04-01

## 2022-04-01 PROBLEM — E11.51 TYPE 2 DIABETES MELLITUS WITH PERIPHERAL ANGIOPATHY (H): Status: ACTIVE | Noted: 2022-04-01

## 2022-04-01 PROBLEM — D03.71: Status: RESOLVED | Noted: 2022-04-01 | Resolved: 2022-04-01

## 2022-04-01 PROBLEM — D47.2 MGUS (MONOCLONAL GAMMOPATHY OF UNKNOWN SIGNIFICANCE): Status: ACTIVE | Noted: 2022-04-01

## 2022-04-13 ENCOUNTER — ASSISTED LIVING VISIT (OUTPATIENT)
Dept: GERIATRICS | Facility: CLINIC | Age: 87
End: 2022-04-13
Payer: COMMERCIAL

## 2022-04-13 VITALS
OXYGEN SATURATION: 97 % | RESPIRATION RATE: 14 BRPM | TEMPERATURE: 97.9 F | BODY MASS INDEX: 26.28 KG/M2 | SYSTOLIC BLOOD PRESSURE: 123 MMHG | HEART RATE: 88 BPM | WEIGHT: 183.6 LBS | HEIGHT: 70 IN | DIASTOLIC BLOOD PRESSURE: 75 MMHG

## 2022-04-13 DIAGNOSIS — E55.9 VITAMIN D DEFICIENCY: ICD-10-CM

## 2022-04-13 DIAGNOSIS — E03.9 ACQUIRED HYPOTHYROIDISM: ICD-10-CM

## 2022-04-13 DIAGNOSIS — I10 PRIMARY HYPERTENSION: ICD-10-CM

## 2022-04-13 DIAGNOSIS — M15.9 OSTEOARTHRITIS OF MULTIPLE JOINTS, UNSPECIFIED OSTEOARTHRITIS TYPE: ICD-10-CM

## 2022-04-13 DIAGNOSIS — E11.51 TYPE 2 DIABETES MELLITUS WITH PERIPHERAL ANGIOPATHY (H): Primary | ICD-10-CM

## 2022-04-13 DIAGNOSIS — E53.8 VITAMIN B12 DEFICIENCY (NON ANEMIC): ICD-10-CM

## 2022-04-13 DIAGNOSIS — E11.42 DIABETIC POLYNEUROPATHY ASSOCIATED WITH TYPE 2 DIABETES MELLITUS (H): ICD-10-CM

## 2022-04-13 DIAGNOSIS — F32.A DEPRESSION, UNSPECIFIED DEPRESSION TYPE: ICD-10-CM

## 2022-04-13 DIAGNOSIS — M54.16 LUMBAR RADICULOPATHY: ICD-10-CM

## 2022-04-13 DIAGNOSIS — F03.90 DEMENTIA WITHOUT BEHAVIORAL DISTURBANCE, UNSPECIFIED DEMENTIA TYPE: ICD-10-CM

## 2022-04-13 NOTE — LETTER
4/13/2022        RE: Eduardo Cowan  [facility]  [address] [address]        Eduardo Cowan is a 86 year old male seen April 13, 2022 at [facility] where he has resided for almost 3 years (admit 6/2019), recently turned over to [facility] and seen for initial visit.   Pt is seen in his apartment up to recliner.  Reports he is feeling okay, has random shocks here and there that seem to occur frequently. Thinks it may be related to his chronic neuropathy.  Also has some pressure areas on both great toes, may be related to the slippers he wears.  Has neuropathic pain in both feet.   By chart review, pt has not walked in past 5 years, needs assist for transfers and all cares.   [place] evaluation for his neuropathy thought to be idiopathic; also has left L5 radiculopathy    Pt had been driving with hand controls until 2020, failed a behind-the-wheel driving assessment in March 2021.   Other PMH includes GERD, liver hemangioma, HTN, hypothyroidism, DM2, h/o melanoma on RLE, MGUS, OA of both knees and hearing loss.   He has not had any recent hospitalizations.       Past Medical History:   Diagnosis Date     Acquired hypothyroidism      Acute duodenal ulcer with hemorrhage, without mention of obstruction 01/01/1982    Bleeding duodenal ulcer     Cataract      Diverticulitis of colon      Elevated prostate specific antigen (PSA)     Has had benign biopsies.  Was 10.8 in 2-02.     Gastroesophageal reflux disease without esophagitis      Hypertrophy (benign) of prostate      Lumbar radiculopathy      Major depressive disorder with single episode, in partial remission (H)      Melanoma of skin (H)      Mixed hyperlipidemia      Osteoarthritis of multiple joints      Primary hypertension      Tubular adenoma of colon      Type 2 diabetes mellitus with diabetic polyneuropathy, without long-term current use of insulin (H)      Unspecified essential hypertension       "Unspecified hereditary and idiopathic peripheral neuropathy        Past Surgical History:   Procedure Laterality Date     [facility] NONSPECIFIC PROCEDURE      TURP     [facility] NONSPECIFIC PROCEDURE  2001    Resection of segment of colon for large polyp     [facility] NONSPECIFIC PROCEDURE  1990    Rt inguinal herniorraphy     [facility] NONSPECIFIC PROCEDURE      Tonsillectomy     SH: , he has 5 children   Originally from [place]  Worked for [employer].  Smoker, distant past     ROS: Arabella lift or stand-pivot transfers with max assist.   Has an electric WC  Wt Readings from Last 5 Encounters:   04/13/22 83.3 kg (183 lb 9.6 oz)   03/15/22 83.3 kg (183 lb 9.6 oz)   07/09/19 88.5 kg (195 lb 3.2 oz)   04/17/03 92.1 kg (203 lb)      EXAM: NAD  /75   Pulse 88   Temp 97.9  F (36.6  C)   Resp 14   Ht 1.778 m (5' 10\")   Wt 83.3 kg (183 lb 9.6 oz)   SpO2 97%   BMI 26.34 kg/m     Neck supple without adenopathy  Lungs with decreased BS, no rales or wheeze  Heart RRR s1s2 distant  Abd soft, NT, no distention or guarding, +BS  Ext without edema; no open areas but erythema sides of both great toes  Neuro: leans right in his chair.  Limited history, no tremor   Psych: affect okay     Last Comprehensive Metabolic Panel:  Sodium   Date Value Ref Range Status   03/22/2022 140 136 - 145 mmol/L Final     Potassium   Date Value Ref Range Status   03/22/2022 4.2 3.5 - 5.0 mmol/L Final     Carbon Dioxide (CO2)   Date Value Ref Range Status   03/22/2022 28 22 - 31 mmol/L Final     Glucose   Date Value Ref Range Status   03/22/2022 174 (H) 70 - 125 mg/dL Final     Urea Nitrogen   Date Value Ref Range Status   03/22/2022 25 8 - 28 mg/dL Final     Creatinine   Date Value Ref Range Status   03/22/2022 1.04 0.70 - 1.30 mg/dL Final     GFR Estimate   Date Value Ref Range Status   03/22/2022 70 >60 mL/min/1.73m2 Final     Calcium   Date Value Ref Range Status   03/22/2022 9.7 8.5 - 10.5 mg/dL Final     Lab Results   Component Value Date "    AST 31 03/22/2022      ALBUMIN 3.5 03/22/2022      ALKPHOS 44 03/22/2022     Lab Results   Component Value Date    WBC 3.4 03/22/2022      HGB 12.4 03/22/2022      MCV 94 03/22/2022       03/22/2022       IMP/PLAN:   (E11.51) Type 2 diabetes mellitus with peripheral angiopathy (H)  (primary encounter diagnosis)  Comment:   8/6/2021:      Hemoglobin A1C <=5.6 % 6.5 High       Plan: diet-controlled.  Follow A1C   He is on daily ASA, simvastatin 40 mg/day and an AARB   Ophthalmology and Podiatry follow up     (E11.42) Diabetic polyneuropathy associated with type 2 diabetes mellitus (H)  Comment: severe neuropathic symptoms have limited mobility   Plan: pregabalin 200 mg tid   Recommend softer slippers or shoes that don't rub on toes; need to monitor for open areas       (I10) Primary hypertension  Comment:   BP Readings from Last 3 Encounters:   04/13/22 123/75   03/15/22 (!) 144/86   07/09/19 161/85      Plan: losartan 25 mg/day    (M15.9) Osteoarthritis of multiple joints, unspecified osteoarthritis type  (M54.16) Lumbar radiculopathy  Comment: ongoing pain sx  Plan: acetaminophen 1000 mg/ bid and prn, local measures    (F03.90) Dementia without behavioral disturbance, unspecified dementia type (H)  Comment: gradual cognitive decline   Plan: [facility] unit for support with transfers, med admin, meals, activity      (E03.9) Acquired hypothyroidism  Comment:   TSH   Date Value Ref Range Status   11/02/2021 1.93 0.30 - 5.00 uIU/mL Final      Plan: levothyroxine 75 mcg/day; yearly TSH     (F32.A) Depression, unspecified depression type  Comment: by hx  Plan: sertraline 100 mg /day     (E53.8) Vitamin B12 deficiency (non anemic)  Comment: level 193 in November 2021 >>>490 in March 2022  Plan: continue B12 1000 mcg/day       (E55.9) Vitamin D deficiency  Comment: level 17 in November 2021>>>>56 in March 2022    Plan: decrease Vit D to 25 mcg/day maintenance dose      Grace Giordano MD            Sincerely,        Grace Giordano MD

## 2022-04-13 NOTE — PROGRESS NOTES
Eduardo Cowan is a 86 year old male seen April 13, 2022 at [facility] where he has resided for almost 3 years (admit 6/2019), recently turned over to [facility] and seen for initial visit.   Pt is seen in his apartment up to recliner.  Reports he is feeling okay, has random shocks here and there that seem to occur frequently. Thinks it may be related to his chronic neuropathy.  Also has some pressure areas on both great toes, may be related to the slippers he wears.  Has neuropathic pain in both feet.   By chart review, pt has not walked in past 5 years, needs assist for transfers and all cares.   [place] evaluation for his neuropathy thought to be idiopathic; also has left L5 radiculopathy    Pt had been driving with hand controls until 2020, failed a behind-the-wheel driving assessment in March 2021.   Other PMH includes GERD, liver hemangioma, HTN, hypothyroidism, DM2, h/o melanoma on RLE, MGUS, OA of both knees and hearing loss.   He has not had any recent hospitalizations.       Past Medical History:   Diagnosis Date     Acquired hypothyroidism      Acute duodenal ulcer with hemorrhage, without mention of obstruction 01/01/1982    Bleeding duodenal ulcer     Cataract      Diverticulitis of colon      Elevated prostate specific antigen (PSA)     Has had benign biopsies.  Was 10.8 in 2-02.     Gastroesophageal reflux disease without esophagitis      Hypertrophy (benign) of prostate      Lumbar radiculopathy      Major depressive disorder with single episode, in partial remission (H)      Melanoma of skin (H)      Mixed hyperlipidemia      Osteoarthritis of multiple joints      Primary hypertension      Tubular adenoma of colon      Type 2 diabetes mellitus with diabetic polyneuropathy, without long-term current use of insulin (H)      Unspecified essential hypertension      Unspecified hereditary and idiopathic peripheral neuropathy        Past Surgical History:   Procedure Laterality Date      "Z NONSPECIFIC PROCEDURE      TURP     [facility] NONSPECIFIC PROCEDURE  2001    Resection of segment of colon for large polyp     [facility] NONSPECIFIC PROCEDURE  1990    Rt inguinal herniorraphy     ZZ NONSPECIFIC PROCEDURE      Tonsillectomy     SH: , he has 5 children   Originally from [place]  Worked for [employer].  Smoker, distant past     ROS: Arabella lift or stand-pivot transfers with max assist.   Has an electric WC  Wt Readings from Last 5 Encounters:   04/13/22 83.3 kg (183 lb 9.6 oz)   03/15/22 83.3 kg (183 lb 9.6 oz)   07/09/19 88.5 kg (195 lb 3.2 oz)   04/17/03 92.1 kg (203 lb)      EXAM: NAD  /75   Pulse 88   Temp 97.9  F (36.6  C)   Resp 14   Ht 1.778 m (5' 10\")   Wt 83.3 kg (183 lb 9.6 oz)   SpO2 97%   BMI 26.34 kg/m     Neck supple without adenopathy  Lungs with decreased BS, no rales or wheeze  Heart RRR s1s2 distant  Abd soft, NT, no distention or guarding, +BS  Ext without edema; no open areas but erythema sides of both great toes  Neuro: leans right in his chair.  Limited history, no tremor   Psych: affect okay     Last Comprehensive Metabolic Panel:  Sodium   Date Value Ref Range Status   03/22/2022 140 136 - 145 mmol/L Final     Potassium   Date Value Ref Range Status   03/22/2022 4.2 3.5 - 5.0 mmol/L Final     Carbon Dioxide (CO2)   Date Value Ref Range Status   03/22/2022 28 22 - 31 mmol/L Final     Glucose   Date Value Ref Range Status   03/22/2022 174 (H) 70 - 125 mg/dL Final     Urea Nitrogen   Date Value Ref Range Status   03/22/2022 25 8 - 28 mg/dL Final     Creatinine   Date Value Ref Range Status   03/22/2022 1.04 0.70 - 1.30 mg/dL Final     GFR Estimate   Date Value Ref Range Status   03/22/2022 70 >60 mL/min/1.73m2 Final     Calcium   Date Value Ref Range Status   03/22/2022 9.7 8.5 - 10.5 mg/dL Final     Lab Results   Component Value Date    AST 31 03/22/2022      ALBUMIN 3.5 03/22/2022      ALKPHOS 44 03/22/2022     Lab Results   Component Value Date    WBC " 3.4 03/22/2022      HGB 12.4 03/22/2022      MCV 94 03/22/2022       03/22/2022       IMP/PLAN:   (E11.51) Type 2 diabetes mellitus with peripheral angiopathy (H)  (primary encounter diagnosis)  Comment:   8/6/2021:      Hemoglobin A1C <=5.6 % 6.5 High       Plan: diet-controlled.  Follow A1C   He is on daily ASA, simvastatin 40 mg/day and an AARB   Ophthalmology and Podiatry follow up     (E11.42) Diabetic polyneuropathy associated with type 2 diabetes mellitus (H)  Comment: severe neuropathic symptoms have limited mobility   Plan: pregabalin 200 mg tid   Recommend softer slippers or shoes that don't rub on toes; need to monitor for open areas       (I10) Primary hypertension  Comment:   BP Readings from Last 3 Encounters:   04/13/22 123/75   03/15/22 (!) 144/86   07/09/19 161/85      Plan: losartan 25 mg/day    (M15.9) Osteoarthritis of multiple joints, unspecified osteoarthritis type  (M54.16) Lumbar radiculopathy  Comment: ongoing pain sx  Plan: acetaminophen 1000 mg/ bid and prn, local measures    (F03.90) Dementia without behavioral disturbance, unspecified dementia type (H)  Comment: gradual cognitive decline   Plan: [facility] unit for support with transfers, med admin, meals, activity      (E03.9) Acquired hypothyroidism  Comment:   TSH   Date Value Ref Range Status   11/02/2021 1.93 0.30 - 5.00 uIU/mL Final      Plan: levothyroxine 75 mcg/day; yearly TSH     (F32.A) Depression, unspecified depression type  Comment: by hx  Plan: sertraline 100 mg /day     (E53.8) Vitamin B12 deficiency (non anemic)  Comment: level 193 in November 2021 >>>490 in March 2022  Plan: continue B12 1000 mcg/day       (E55.9) Vitamin D deficiency  Comment: level 17 in November 2021>>>>56 in March 2022    Plan: decrease Vit D to 25 mcg/day maintenance dose      Grace Giordano MD

## 2022-04-23 DIAGNOSIS — R52 PAIN: ICD-10-CM

## 2022-04-25 RX ORDER — PSEUDOEPHED/ACETAMINOPH/DIPHEN 30MG-500MG
TABLET ORAL
Qty: 124 TABLET | Refills: 11 | Status: SHIPPED | OUTPATIENT
Start: 2022-04-25

## 2022-05-03 ENCOUNTER — LAB REQUISITION (OUTPATIENT)
Dept: LAB | Facility: CLINIC | Age: 87
End: 2022-05-03
Payer: COMMERCIAL

## 2022-05-03 DIAGNOSIS — Z11.52 ENCOUNTER FOR SCREENING FOR COVID-19: ICD-10-CM

## 2022-05-04 PROCEDURE — U0003 INFECTIOUS AGENT DETECTION BY NUCLEIC ACID (DNA OR RNA); SEVERE ACUTE RESPIRATORY SYNDROME CORONAVIRUS 2 (SARS-COV-2) (CORONAVIRUS DISEASE [COVID-19]), AMPLIFIED PROBE TECHNIQUE, MAKING USE OF HIGH THROUGHPUT TECHNOLOGIES AS DESCRIBED BY CMS-2020-01-R: HCPCS | Mod: ORL | Performed by: INTERNAL MEDICINE

## 2022-05-05 LAB — SARS-COV-2 RNA RESP QL NAA+PROBE: NEGATIVE

## 2022-05-09 ENCOUNTER — TELEPHONE (OUTPATIENT)
Dept: GERIATRICS | Facility: CLINIC | Age: 87
End: 2022-05-09
Payer: COMMERCIAL

## 2022-05-09 ENCOUNTER — LAB REQUISITION (OUTPATIENT)
Dept: LAB | Facility: CLINIC | Age: 87
End: 2022-05-09
Payer: COMMERCIAL

## 2022-05-09 DIAGNOSIS — Z11.52 ENCOUNTER FOR SCREENING FOR COVID-19: ICD-10-CM

## 2022-05-09 DIAGNOSIS — H61.23 BILATERAL IMPACTED CERUMEN: Primary | ICD-10-CM

## 2022-05-09 NOTE — TELEPHONE ENCOUNTER
[facility] Nurse Triage Telephone Note    Provider: REENA Hay CNP   Facility[facility]   Facility Type:  AL    Caller: Marcellus  Call Back Number: [phone]    Allergies   Allergen Reactions     Amlodipine Besylate      swelling of feet       Reason for call: Pt c/o ears feeling clogged since Saturday 5/7. Denies any nasal or sinus congestion. Has had difficulty hearing since onset of symptom.    Verbal Order/Direction given by Provider:   - Debrox 6.5% Instill 5 drops to both ears BID x 7 days    Provider giving Order:  Grace Giordano MD    Verbal Order given to: Marcellus Duque RN

## 2022-05-11 PROCEDURE — U0003 INFECTIOUS AGENT DETECTION BY NUCLEIC ACID (DNA OR RNA); SEVERE ACUTE RESPIRATORY SYNDROME CORONAVIRUS 2 (SARS-COV-2) (CORONAVIRUS DISEASE [COVID-19]), AMPLIFIED PROBE TECHNIQUE, MAKING USE OF HIGH THROUGHPUT TECHNOLOGIES AS DESCRIBED BY CMS-2020-01-R: HCPCS | Mod: ORL | Performed by: INTERNAL MEDICINE

## 2022-05-12 LAB — SARS-COV-2 RNA RESP QL NAA+PROBE: NEGATIVE

## 2022-05-14 DIAGNOSIS — E53.8 VITAMIN B12 DEFICIENCY (NON ANEMIC): ICD-10-CM

## 2022-05-16 ENCOUNTER — LAB REQUISITION (OUTPATIENT)
Dept: LAB | Facility: CLINIC | Age: 87
End: 2022-05-16
Payer: COMMERCIAL

## 2022-05-16 DIAGNOSIS — Z11.52 ENCOUNTER FOR SCREENING FOR COVID-19: ICD-10-CM

## 2022-05-16 RX ORDER — LANOLIN ALCOHOL/MO/W.PET/CERES
CREAM (GRAM) TOPICAL
Qty: 90 TABLET | Refills: 97 | Status: SHIPPED | OUTPATIENT
Start: 2022-05-16

## 2022-05-17 VITALS
BODY MASS INDEX: 26.34 KG/M2 | TEMPERATURE: 97.7 F | WEIGHT: 183.6 LBS | HEART RATE: 90 BPM | SYSTOLIC BLOOD PRESSURE: 151 MMHG | DIASTOLIC BLOOD PRESSURE: 100 MMHG | RESPIRATION RATE: 14 BRPM

## 2022-05-17 PROCEDURE — U0003 INFECTIOUS AGENT DETECTION BY NUCLEIC ACID (DNA OR RNA); SEVERE ACUTE RESPIRATORY SYNDROME CORONAVIRUS 2 (SARS-COV-2) (CORONAVIRUS DISEASE [COVID-19]), AMPLIFIED PROBE TECHNIQUE, MAKING USE OF HIGH THROUGHPUT TECHNOLOGIES AS DESCRIBED BY CMS-2020-01-R: HCPCS | Mod: ORL | Performed by: INTERNAL MEDICINE

## 2022-05-18 ENCOUNTER — ASSISTED LIVING VISIT (OUTPATIENT)
Dept: GERIATRICS | Facility: CLINIC | Age: 87
End: 2022-05-18
Payer: COMMERCIAL

## 2022-05-18 DIAGNOSIS — M54.16 LUMBAR RADICULOPATHY: ICD-10-CM

## 2022-05-18 DIAGNOSIS — I10 PRIMARY HYPERTENSION: ICD-10-CM

## 2022-05-18 DIAGNOSIS — F03.90 DEMENTIA WITHOUT BEHAVIORAL DISTURBANCE, UNSPECIFIED DEMENTIA TYPE: ICD-10-CM

## 2022-05-18 DIAGNOSIS — M15.9 OSTEOARTHRITIS OF MULTIPLE JOINTS, UNSPECIFIED OSTEOARTHRITIS TYPE: ICD-10-CM

## 2022-05-18 DIAGNOSIS — E11.42 DIABETIC POLYNEUROPATHY ASSOCIATED WITH TYPE 2 DIABETES MELLITUS (H): ICD-10-CM

## 2022-05-18 DIAGNOSIS — H61.23 BILATERAL IMPACTED CERUMEN: Primary | ICD-10-CM

## 2022-05-18 DIAGNOSIS — F32.A DEPRESSION, UNSPECIFIED DEPRESSION TYPE: ICD-10-CM

## 2022-05-18 DIAGNOSIS — E11.51 TYPE 2 DIABETES MELLITUS WITH PERIPHERAL ANGIOPATHY (H): ICD-10-CM

## 2022-05-18 LAB — SARS-COV-2 RNA RESP QL NAA+PROBE: NEGATIVE

## 2022-05-18 NOTE — LETTER
5/18/2022        RE: Eduardo Cowan  [facility]  [address] [address]        [facility]    Chief Complaint   Patient presents with     RECHECK     HPI:  Eduardo Cowan is a 86 year old  (1935), who is being seen today for an episodic care visit at: [facility] ([facility]) [[id number]].   He has lived at this facility since 6/2019 and we assumed primary care in 3/2022.   Medical history significant for mild dementia, diabetes type 2 with neuropathy, HTN, lumbar radiculopathy, osteoarthritis, GERD, depression.    His son Dony Cowan and his grandson are present today.     Today's concern is:      Bilateral impacted cerumen  Type 2 diabetes mellitus with peripheral angiopathy (H)  Diabetic polyneuropathy associated with type 2 diabetes mellitus (H)  Dementia without behavioral disturbance, unspecified dementia type (H)  Primary hypertension  Lumbar radiculopathy  Osteoarthritis of multiple joints, unspecified osteoarthritis type  Depression, unspecified depression type  He reports feeling fairly well. Had an episode of shortness of breath on 5//3/2022 that quickly passed. Reports this is not unusual for him. No cough or chest pain. Afebrile and no hypoxia. Good appetite. Received debrox 5/9-5/16/2022 and reports both ears feel plugged. No pain in his ears. Reports neuropathy of both feet is unchanged. Pleasant and talkative. Sleeping well.   Uses an electric scooter. Transfers with diego or pivots with max assist. Requires assist of 1 with cares.     Allergies, and PMH/PSH reviewed in EPIC today    REVIEW OF SYSTEMS:  4 point ROS including Respiratory, CV, GI and , other than that noted in the HPI,  is negative    Objective:   BP (!) 151/100   Pulse 90   Temp 97.7  F (36.5  C)   Resp 14   Wt 83.3 kg (183 lb 9.6 oz)   BMI 26.34 kg/m    GENERAL APPEARANCE:  Alert, in no distress  ENT:  both ear canals impacted with cerumen on otoscope exam  "  EYES:  EOM normal, conjunctiva and lids normal  NECK: no adenopathy,masses or thyromegaly  RESP:  lungs clear to auscultation , no respiratory distress  CV:  regular rate and rhythm, no murmur,  peripheral edema trace in both LE  ABDOMEN:  soft, non-tender, no distension, no masses  M/S:  up in scooter. Mild weakness both LE. Good upper body strength. No joint inflammation  SKIN:  mild erythema over toes and bony prominences of both feet, skin intact. No rashes   PSYCH:  oriented X 2-3, , memory impaired , affect and mood normal    Recent labs in [facility] reviewed by me today.     ASSESSMENT / PLAN:  (H61.23) Bilateral impacted cerumen  (primary encounter diagnosis)  Comment: both ears were flushed and large amounts of cerumen removed with curette. Tolerated the procedure well. Ears still felt a \"little\" plugged after removal.   Plan: repeat debrox and flushing next month.     (E11.51) Type 2 diabetes mellitus with peripheral angiopathy (H)  (E11.42) Diabetic polyneuropathy associated with type 2 diabetes mellitus (H)  Comment: diet controlled. HgbA1c 6.5 on 8/6/2022  He has minor pressure areas on both feet that may be related to the seam on his slippers. Son will purchase a new pair.   Plan: monitor blood glucose prn. Follow HgbA1c     (F03.90) Dementia without behavioral disturbance, unspecified dementia type (H)  Comment: mild to moderate deficits, poor short memory. Low functional status due to comorbidities   Plan: Memory Care staff assistance with cares, meals, activities and med admin     (I10) Primary hypertension  Comment: controlled with BPs: 126/84, 135/82, 134/87  HR: 81-90  Creatinine 1.04 on 3/22/2022  Plan: continue losartan. Follow BMP    (M54.16) Lumbar radiculopathy  (M15.9) Osteoarthritis of multiple joints, unspecified osteoarthritis type  Comment: he has been nonambulatory for several years. Pain controlled   Plan: continue tylenol, Lyrica     (F32.A) Depression, unspecified depression " type  Comment: well managed   Plan: continue sertraline and melatonin     Total time spent with patient visit at the AL facility was 45 min including patient visit, review of past records, discussion with son.   Greater than 50% of total time spent with counseling and coordinating care due to multiple medical issues and cerumen removal. Counseling patient and son re: medications and potential side effect, plan of care, management of cerumen, concerns re: skin.  Coordinating the following with facility staff: medication orders and plan of care.         Electronically signed by: REENA Loaiza CNP             Sincerely,        REENA Loaiza CNP

## 2022-05-18 NOTE — PROGRESS NOTES
[facility] GERIATRICS    Chief Complaint   Patient presents with     RECHECK     HPI:  Eduardo Cowan is a 86 year old  (1935), who is being seen today for an episodic care visit at: [facility] ([facility]) [[id number]].   He has lived at this facility since 6/2019 and we assumed primary care in 3/2022.   Medical history significant for mild dementia, diabetes type 2 with neuropathy, HTN, lumbar radiculopathy, osteoarthritis, GERD, depression.    His son Dony Cowan and his grandson are present today.     Today's concern is:      Bilateral impacted cerumen  Type 2 diabetes mellitus with peripheral angiopathy (H)  Diabetic polyneuropathy associated with type 2 diabetes mellitus (H)  Dementia without behavioral disturbance, unspecified dementia type (H)  Primary hypertension  Lumbar radiculopathy  Osteoarthritis of multiple joints, unspecified osteoarthritis type  Depression, unspecified depression type  He reports feeling fairly well. Had an episode of shortness of breath on 5//3/2022 that quickly passed. Reports this is not unusual for him. No cough or chest pain. Afebrile and no hypoxia. Good appetite. Received debrox 5/9-5/16/2022 and reports both ears feel plugged. No pain in his ears. Reports neuropathy of both feet is unchanged. Pleasant and talkative. Sleeping well.   Uses an electric scooter. Transfers with diego or pivots with max assist. Requires assist of 1 with cares.     Allergies, and PMH/PSH reviewed in EPIC today    REVIEW OF SYSTEMS:  4 point ROS including Respiratory, CV, GI and , other than that noted in the HPI,  is negative    Objective:   BP (!) 151/100   Pulse 90   Temp 97.7  F (36.5  C)   Resp 14   Wt 83.3 kg (183 lb 9.6 oz)   BMI 26.34 kg/m    GENERAL APPEARANCE:  Alert, in no distress  ENT:  both ear canals impacted with cerumen on otoscope exam   EYES:  EOM normal, conjunctiva and lids normal  NECK: no adenopathy,masses or thyromegaly  RESP:  lungs clear to  "auscultation , no respiratory distress  CV:  regular rate and rhythm, no murmur,  peripheral edema trace in both LE  ABDOMEN:  soft, non-tender, no distension, no masses  M/S:  up in scooter. Mild weakness both LE. Good upper body strength. No joint inflammation  SKIN:  mild erythema over toes and bony prominences of both feet, skin intact. No rashes   PSYCH:  oriented X 2-3, , memory impaired , affect and mood normal    Recent labs in [facility] reviewed by me today.     ASSESSMENT / PLAN:  (H61.23) Bilateral impacted cerumen  (primary encounter diagnosis)  Comment: both ears were flushed and large amounts of cerumen removed with curette. Tolerated the procedure well. Ears still felt a \"little\" plugged after removal.   Plan: repeat debrox and flushing next month.     (E11.51) Type 2 diabetes mellitus with peripheral angiopathy (H)  (E11.42) Diabetic polyneuropathy associated with type 2 diabetes mellitus (H)  Comment: diet controlled. HgbA1c 6.5 on 8/6/2022  He has minor pressure areas on both feet that may be related to the seam on his slippers. Son will purchase a new pair.   Plan: monitor blood glucose prn. Follow HgbA1c     (F03.90) Dementia without behavioral disturbance, unspecified dementia type (H)  Comment: mild to moderate deficits, poor short memory. Low functional status due to comorbidities   Plan: Memory Care staff assistance with cares, meals, activities and med admin     (I10) Primary hypertension  Comment: controlled with BPs: 126/84, 135/82, 134/87  HR: 81-90  Creatinine 1.04 on 3/22/2022  Plan: continue losartan. Follow BMP    (M54.16) Lumbar radiculopathy  (M15.9) Osteoarthritis of multiple joints, unspecified osteoarthritis type  Comment: he has been nonambulatory for several years. Pain controlled   Plan: continue tylenol, Lyrica     (F32.A) Depression, unspecified depression type  Comment: well managed   Plan: continue sertraline and melatonin     Total time spent with patient visit at the AL " facility was 45 min including patient visit, review of past records, discussion with son.   Greater than 50% of total time spent with counseling and coordinating care due to multiple medical issues and cerumen removal. Counseling patient and son re: medications and potential side effect, plan of care, management of cerumen, concerns re: skin.  Coordinating the following with facility staff: medication orders and plan of care.         Electronically signed by: REENA Loaiza CNP

## 2022-05-19 ENCOUNTER — LAB REQUISITION (OUTPATIENT)
Dept: LAB | Facility: CLINIC | Age: 87
End: 2022-05-19
Payer: COMMERCIAL

## 2022-05-19 DIAGNOSIS — Z11.52 ENCOUNTER FOR SCREENING FOR COVID-19: ICD-10-CM

## 2022-05-24 PROCEDURE — U0003 INFECTIOUS AGENT DETECTION BY NUCLEIC ACID (DNA OR RNA); SEVERE ACUTE RESPIRATORY SYNDROME CORONAVIRUS 2 (SARS-COV-2) (CORONAVIRUS DISEASE [COVID-19]), AMPLIFIED PROBE TECHNIQUE, MAKING USE OF HIGH THROUGHPUT TECHNOLOGIES AS DESCRIBED BY CMS-2020-01-R: HCPCS | Mod: ORL | Performed by: INTERNAL MEDICINE

## 2022-05-25 LAB — SARS-COV-2 RNA RESP QL NAA+PROBE: NEGATIVE

## 2022-05-27 ENCOUNTER — LAB REQUISITION (OUTPATIENT)
Dept: LAB | Facility: CLINIC | Age: 87
End: 2022-05-27
Payer: COMMERCIAL

## 2022-05-27 DIAGNOSIS — Z11.52 ENCOUNTER FOR SCREENING FOR COVID-19: ICD-10-CM

## 2022-06-01 PROCEDURE — U0003 INFECTIOUS AGENT DETECTION BY NUCLEIC ACID (DNA OR RNA); SEVERE ACUTE RESPIRATORY SYNDROME CORONAVIRUS 2 (SARS-COV-2) (CORONAVIRUS DISEASE [COVID-19]), AMPLIFIED PROBE TECHNIQUE, MAKING USE OF HIGH THROUGHPUT TECHNOLOGIES AS DESCRIBED BY CMS-2020-01-R: HCPCS | Mod: ORL | Performed by: INTERNAL MEDICINE

## 2022-06-02 LAB — SARS-COV-2 RNA RESP QL NAA+PROBE: NEGATIVE

## 2022-06-06 ENCOUNTER — LAB REQUISITION (OUTPATIENT)
Dept: LAB | Facility: CLINIC | Age: 87
End: 2022-06-06
Payer: COMMERCIAL

## 2022-06-06 DIAGNOSIS — Z11.52 ENCOUNTER FOR SCREENING FOR COVID-19: ICD-10-CM

## 2022-06-07 PROCEDURE — U0005 INFEC AGEN DETEC AMPLI PROBE: HCPCS | Mod: ORL | Performed by: INTERNAL MEDICINE

## 2022-06-08 DIAGNOSIS — H61.23 BILATERAL IMPACTED CERUMEN: Primary | ICD-10-CM

## 2022-06-08 LAB — SARS-COV-2 RNA RESP QL NAA+PROBE: NEGATIVE

## 2022-06-12 DIAGNOSIS — F32.9 MAJOR DEPRESSION: ICD-10-CM

## 2022-06-13 RX ORDER — SERTRALINE HYDROCHLORIDE 100 MG/1
TABLET, FILM COATED ORAL
Qty: 28 TABLET | Refills: 97 | Status: SHIPPED | OUTPATIENT
Start: 2022-06-13

## 2022-06-14 VITALS
DIASTOLIC BLOOD PRESSURE: 80 MMHG | BODY MASS INDEX: 26.34 KG/M2 | TEMPERATURE: 97.8 F | SYSTOLIC BLOOD PRESSURE: 127 MMHG | RESPIRATION RATE: 14 BRPM | HEART RATE: 81 BPM | WEIGHT: 183.6 LBS

## 2022-06-15 DIAGNOSIS — H61.23 BILATERAL IMPACTED CERUMEN: Primary | ICD-10-CM

## 2022-06-15 NOTE — PROGRESS NOTES
[facility] GERIATRICS    Chief Complaint   Patient presents with     RECHECK     HPI:  Eduardo Cowan is a 86 year old  (1935), who is being seen today for an episodic care visit at: [facility] ([facility]) [269].   He has lived at this facility since 6/2019 and we assumed primary care in 3/2022.   Medical history significant for mild dementia, diabetes type 2 with neuropathy, HTN, lumbar radiculopathy, osteoarthritis, GERD, depression.    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 His daughter and grandson are present.   Debrox was ordered last week and today the nurses report it has not been available from pharmacy. He reports both ears feel plugged, but are not painful. Feeling well. Reports mild shortness of breath at times and an occasional dry cough, unchanged from baseline. Denies feeling ill. Denies any increase in chronic pain or neuropathy. Nonambulatory and uses an electric scooter. Arabella is used for transfers. Requires assist of 1 with cares.     Allergies, and PMH/PSH reviewed in EPIC today    REVIEW OF SYSTEMS:  4 point ROS including Respiratory, CV, GI and , other than that noted in the HPI,  is negative    Objective:   /80   Pulse 81   Temp 97.8  F (36.6  C)   Resp 14   Wt 83.3 kg (183 lb 9.6 oz)   BMI 26.34 kg/m    GENERAL APPEARANCE:  Alert, in no distress  ENT:  [place], both ears examined with otoscope-left ear canal with small amount cerumen, right ear canal with large amount cerumen   EYES: conjunctiva and lids normal  NECK: no adenopathy,masses or thyromegaly  RESP:  lungs clear to auscultation , no respiratory  distress  CV:  regular rate and rhythm, no murmur,  peripheral edema trace in both LE  ABDOMEN:  soft, non-tender, no distension, no masses  M/S:  up in scooter. Mild weakness both LE. Good upper body strength. No joint inflammation  SKIN: no visible rashes or open areas  PSYCH:  oriented X 3, memory impaired , affect and mood normal    Recent labs in [facility] reviewed by me today.     ASSESSMENT / PLAN:  (H61.23) Bilateral impacted cerumen  (primary encounter diagnosis)  Comment: debrox not available from pharmacy.  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 HgbA1c 6.5 on 8/22/2021  Plan: HgbA1c at next visit. Monitor blood glucose prn     (F03.90) Dementia without behavioral disturbance, unspecified dementia type (H)  Comment: mild to moderate deficits. Pleasant and conversational   Plan: AL staff assistance with cares, meals, activities and med admin     (I10) Primary hypertension  Comment: controlled with BPs: 128/77, 136/90, 126/72  Creatinine 1.04 on 3/22/2022   Plan: continue losartan     (M54.16) Lumbar radiculopathy  Comment: pain controlled   Plan: continue Lyrica, tylenol           Electronically signed by: REENA Loaiza CNP

## 2022-06-22 ENCOUNTER — ASSISTED LIVING VISIT (OUTPATIENT)
Dept: GERIATRICS | Facility: CLINIC | Age: 87
End: 2022-06-22
Payer: COMMERCIAL

## 2022-06-22 DIAGNOSIS — M54.16 LUMBAR RADICULOPATHY: ICD-10-CM

## 2022-06-22 DIAGNOSIS — I10 PRIMARY HYPERTENSION: ICD-10-CM

## 2022-06-22 DIAGNOSIS — H61.23 BILATERAL IMPACTED CERUMEN: Primary | ICD-10-CM

## 2022-06-22 DIAGNOSIS — E11.51 TYPE 2 DIABETES MELLITUS WITH PERIPHERAL ANGIOPATHY (H): ICD-10-CM

## 2022-06-22 DIAGNOSIS — E11.42 DIABETIC POLYNEUROPATHY ASSOCIATED WITH TYPE 2 DIABETES MELLITUS (H): ICD-10-CM

## 2022-06-22 DIAGNOSIS — F03.90 DEMENTIA WITHOUT BEHAVIORAL DISTURBANCE, UNSPECIFIED DEMENTIA TYPE: ICD-10-CM

## 2022-06-22 NOTE — LETTER
6/15/2022        RE: Eduardo Cowan  [facility]  [address] [address]        [facility] GERIATRICS    Chief Complaint   Patient presents with     RECHECK     HPI:  Eduardo Cowan is a 86 year old  (1935), who is being seen today for an episodic care visit at: [facility] ([facility]) [269].   He has lived at this facility since 6/2019 and we assumed primary care in 3/2022.   Medical history significant for mild dementia, diabetes type 2 with neuropathy, HTN, lumbar radiculopathy, osteoarthritis, GERD, depression.    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 His daughter and grandson are present.   Debrox was ordered last week and today the nurses report it has not been available from pharmacy. He reports both ears feel plugged, but are not painful. Feeling well. Reports mild shortness of breath at times and an occasional dry cough, unchanged from baseline. Denies feeling ill. Denies any increase in chronic pain or neuropathy. Nonambulatory and uses an electric scooter. Arabella is used for transfers. Requires assist of 1 with cares.     Allergies, and PMH/PSH reviewed in EPIC today    REVIEW OF SYSTEMS:  4 point ROS including Respiratory, CV, GI and , other than that noted in the HPI,  is negative    Objective:   /80   Pulse 81   Temp 97.8  F (36.6  C)   Resp 14   Wt 83.3 kg (183 lb 9.6 oz)   BMI 26.34 kg/m    GENERAL APPEARANCE:  Alert, in no distress  ENT:  [place], both ears examined with otoscope-left ear canal with small amount cerumen, right ear canal with large amount cerumen   EYES: conjunctiva  and lids normal  NECK: no adenopathy,masses or thyromegaly  RESP:  lungs clear to auscultation , no respiratory distress  CV:  regular rate and rhythm, no murmur,  peripheral edema trace in both LE  ABDOMEN:  soft, non-tender, no distension, no masses  M/S:  up in scooter. Mild weakness both LE. Good upper body strength. No joint inflammation  SKIN: no visible rashes or open areas  PSYCH:  oriented X 3, memory impaired , affect and mood normal    Recent labs in [facility] reviewed by me today.     ASSESSMENT / PLAN:  (H61.23) Bilateral impacted cerumen  (primary encounter diagnosis)  Comment: debrox not available from pharmacy.  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 HgbA1c 6.5 on 8/22/2021  Plan: HgbA1c at next visit. Monitor blood glucose prn     (F03.90) Dementia without behavioral disturbance, unspecified dementia type (H)  Comment: mild to moderate deficits. Pleasant and conversational   Plan: AL staff assistance with cares, meals, activities and med admin     (I10) Primary hypertension  Comment: controlled with BPs: 128/77, 136/90, 126/72  Creatinine 1.04 on 3/22/2022   Plan: continue losartan     (M54.16) Lumbar radiculopathy  Comment: pain controlled   Plan: continue Lyrica, tylenol           Electronically signed by: REENA Loaiza CNP             Sincerely,        REENA Loaiza CNP

## 2022-07-13 ENCOUNTER — PATIENT OUTREACH (OUTPATIENT)
Dept: GERIATRIC MEDICINE | Facility: CLINIC | Age: 87
End: 2022-07-13

## 2022-07-13 NOTE — PROGRESS NOTES
[facility] [employer] Medicare Chart review      chart   No triggering events at this time   CM will follow-up as needed     Bessie Chavira MA [facility] Care Coordinator   [phone] - vqnw cell phone   [phone] - work fax

## 2022-07-13 NOTE — PROGRESS NOTES
[employer] Medicare Initial enrollment    Member was assigned to [facility] for [employer] Medicare Case Management on: 3-1-22.    Bessie Chavira MA [facility] Coordinator   [phone] - work cell phone   [phone] - work fax

## 2022-07-15 DIAGNOSIS — G62.9 POLYNEUROPATHY, UNSPECIFIED: Primary | ICD-10-CM

## 2022-07-15 RX ORDER — PREGABALIN 200 MG/1
CAPSULE ORAL
Qty: 90 CAPSULE | Refills: 4 | Status: SHIPPED | OUTPATIENT
Start: 2022-07-15

## 2022-07-29 DIAGNOSIS — H61.23 BILATERAL IMPACTED CERUMEN: Primary | ICD-10-CM

## 2022-07-29 NOTE — CONFIDENTIAL NOTE
Eduardo Cowan    1935      Orders 2022:  1. Debrox 5 drops to both ears bid X 5 days for ceruminosis. OK for pharmacy equivalent  Script sent to pharmacy    REENA Loaiza CNP on 2022 at 3:09 PM

## 2022-08-03 ENCOUNTER — ASSISTED LIVING VISIT (OUTPATIENT)
Dept: GERIATRICS | Facility: CLINIC | Age: 87
End: 2022-08-03
Payer: COMMERCIAL

## 2022-08-03 VITALS
WEIGHT: 183.6 LBS | SYSTOLIC BLOOD PRESSURE: 154 MMHG | TEMPERATURE: 97.8 F | BODY MASS INDEX: 26.34 KG/M2 | DIASTOLIC BLOOD PRESSURE: 86 MMHG

## 2022-08-03 DIAGNOSIS — E11.51 TYPE 2 DIABETES MELLITUS WITH PERIPHERAL ANGIOPATHY (H): ICD-10-CM

## 2022-08-03 DIAGNOSIS — H61.23 BILATERAL IMPACTED CERUMEN: Primary | ICD-10-CM

## 2022-08-03 DIAGNOSIS — M54.16 LUMBAR RADICULOPATHY: ICD-10-CM

## 2022-08-03 DIAGNOSIS — I10 PRIMARY HYPERTENSION: ICD-10-CM

## 2022-08-03 DIAGNOSIS — F32.A DEPRESSION, UNSPECIFIED DEPRESSION TYPE: ICD-10-CM

## 2022-08-03 DIAGNOSIS — M15.9 OSTEOARTHRITIS OF MULTIPLE JOINTS, UNSPECIFIED OSTEOARTHRITIS TYPE: ICD-10-CM

## 2022-08-03 DIAGNOSIS — F03.90 DEMENTIA WITHOUT BEHAVIORAL DISTURBANCE, UNSPECIFIED DEMENTIA TYPE: ICD-10-CM

## 2022-08-03 NOTE — PROGRESS NOTES
[facility] GERIATRICS    Chief Complaint   Patient presents with     RECHECK     HPI:  Eduardo Cowan is a 86 year old  (1935), who is being seen today for an episodic care visit at: [facility] ([facility]) [269].   He has lived at this facility since 6/2019 and we assumed primary care in 3/2022.   Medical history significant for mild dementia, diabetes type 2 with neuropathy, HTN, lumbar radiculopathy, osteoarthritis, GERD, depression.    Today's concern is:     Bilateral impacted cerumen  Type 2 diabetes mellitus with peripheral angiopathy (H)  Dementia without behavioral disturbance, unspecified dementia type (H)  Primary hypertension  Lumbar radiculopathy  Osteoarthritis of multiple joints, unspecified osteoarthritis type  Depression, unspecified depression type  He is seen today to follow up on cerumen impaction of both ears. Debrox has been given bid for 5 days. Reports his ears feel plugged, but not painful. His grandson is here to assist with flushing his ears. Reports feeling well with good appetite. Mild shortness of breath at times and feels this is at baseline.  Reports neuropathy of both feet and chronic pain is managed. Nonambulatory and uses an electric scooter. Family has noticed that transfers are becoming more difficult. Requires assist of 1 with cares.     Allergies, and PMH/PSH reviewed in EPIC today    REVIEW OF SYSTEMS:  4 point ROS including Respiratory, CV, GI and , other than that noted in the HPI,  is negative    Objective:   BP (!) 154/86   Temp 97.8  F (36.6  C)   Wt 83.3 kg (183 lb 9.6 oz)   BMI 26.34 kg/m    GENERAL APPEARANCE:  Alert, in no distress  ENT:  [place], both ears examined with otoscope-moderate amount of cerumen in both ear canals   NECK: no adenopathy or  thyromegaly  RESP:  lungs clear to auscultation  CV:  regular rate and rhythm, no murmur,trace edema both LE   ABDOMEN:  soft, non-tender, no distension, no masses  M/S:  up in scooter. Weakness  both LE. Good upper body strength. No joint inflammation  SKIN: no visible rashes or open areas  PSYCH:  oriented X 2- 3, memory impaired , affect and mood normal    Recent labs in [facility] reviewed by me today.     ASSESSMENT / PLAN:  (H61.23) Bilateral impacted cerumen  (primary encounter diagnosis)  Comment: both ears were flushed with good results   Plan: repeat debrox and flushing prn     (E11.51) Type 2 diabetes mellitus with peripheral angiopathy (H)  Comment: diet controlled   Plan: monitor glucose prn. Recheck HgbA1c     (F03.90) Dementia without behavioral disturbance, unspecified dementia type (H)  Comment: mild to moderate deficits, poor short term memory. Pleasant and talkative   Plan: AL staff assistance with cares, meals, activities and med admin     (I10) Primary hypertension  Comment: controlled with BPs: 129/77, 108/72, 149/87   Plan: continue losartan. Avoid hypotension due to advanced age and fall risk     (M54.16) Lumbar radiculopathy  (M15.9) Osteoarthritis of multiple joints, unspecified osteoarthritis type  Comment: pain controlled. Low functional status and nonambulatory due to radiculopathy and neuropathy. Having more difficulty with transfers   Plan: continue tylenol, Lyrica. Refer to [facility] for PHYSICAL THERAPY/OT.     (F32.A) Depression, unspecified depression type  Comment: well managed   Plan: continue sertraline, melatonin          Electronically signed by: REENA Loaiza CNP

## 2022-08-03 NOTE — LETTER
8/3/2022        RE: Eduardo Cowan  [facility]  [address] [address]        [facility] GERIATRICS    Chief Complaint   Patient presents with     RECHECK     HPI:  Eduardo Cowan is a 86 year old  (1935), who is being seen today for an episodic care visit at: [facility] ([facility]) [269].   He has lived at this facility since 6/2019 and we assumed primary care in 3/2022.   Medical history significant for mild dementia, diabetes type 2 with neuropathy, HTN, lumbar radiculopathy, osteoarthritis, GERD, depression.    Today's concern is:     Bilateral impacted cerumen  Type 2 diabetes mellitus with peripheral angiopathy (H)  Dementia without behavioral disturbance, unspecified dementia type (H)  Primary hypertension  Lumbar radiculopathy  Osteoarthritis of multiple joints, unspecified osteoarthritis type  Depression, unspecified depression type  He is seen today to follow up on cerumen impaction of both ears. Debrox has been given bid for 5 days. Reports his ears feel plugged, but not painful. His grandson is here to assist with flushing his ears. Reports feeling well with good appetite. Mild shortness of breath at times and feels this is at baseline.  Reports neuropathy of both feet and chronic pain is managed. Nonambulatory and uses an electric scooter. Family has noticed that transfers are becoming more difficult. Requires assist of 1 with cares.     Allergies, and PMH/PSH reviewed in EPIC today    REVIEW OF SYSTEMS:  4 point ROS including Respiratory, CV, GI and , other than that noted in the HPI,  is negative    Objective:   BP (!) 154/86   Temp 97.8  F (36.6  C)   Wt 83.3 kg (183 lb 9.6 oz)   BMI 26.34 kg/m    GENERAL APPEARANCE:  Alert, in no distress  ENT:  [place], both ears examined with otoscope-moderate amount of cerumen in both ear canals   NECK: no adenopathy or  thyromegaly  RESP:  lungs clear to auscultation  CV:  regular rate and rhythm,  no murmur,trace edema both LE   ABDOMEN:  soft, non-tender, no distension, no masses  M/S:  up in scooter. Weakness both LE. Good upper body strength. No joint inflammation  SKIN: no visible rashes or open areas  PSYCH:  oriented X 2- 3, memory impaired , affect and mood normal    Recent labs in [facility] reviewed by me today.     ASSESSMENT / PLAN:  (H61.23) Bilateral impacted cerumen  (primary encounter diagnosis)  Comment: both ears were flushed with good results   Plan: repeat debrox and flushing prn     (E11.51) Type 2 diabetes mellitus with peripheral angiopathy (H)  Comment: diet controlled   Plan: monitor glucose prn. Recheck HgbA1c     (F03.90) Dementia without behavioral disturbance, unspecified dementia type (H)  Comment: mild to moderate deficits, poor short term memory. Pleasant and talkative   Plan: AL staff assistance with cares, meals, activities and med admin     (I10) Primary hypertension  Comment: controlled with BPs: 129/77, 108/72, 149/87   Plan: continue losartan. Avoid hypotension due to advanced age and fall risk     (M54.16) Lumbar radiculopathy  (M15.9) Osteoarthritis of multiple joints, unspecified osteoarthritis type  Comment: pain controlled. Low functional status and nonambulatory due to radiculopathy and neuropathy. Having more difficulty with transfers   Plan: continue tylenol, Lyrica. Refer to [facility] for PHYSICAL THERAPY/OT.     (F32.A) Depression, unspecified depression type  Comment: well managed   Plan: continue sertraline, melatonin          Electronically signed by: REENA Loaiza CNP             Sincerely,        REENA Loaiza CNP

## 2022-09-08 ENCOUNTER — PATIENT OUTREACH (OUTPATIENT)
Dept: GERIATRIC MEDICINE | Facility: CLINIC | Age: 87
End: 2022-09-08

## 2022-09-08 NOTE — PROGRESS NOTES
Opening [facility] Medicare program and updating targets and tasks per [employer] launch.    Brandi Richardson  Care Management Specialist   [facility]   [phone]

## 2022-09-13 NOTE — PROGRESS NOTES
"[facility] GERIATRICS    Chief Complaint   Patient presents with     RECHECK     HPI:  Eduardo Cowan is a 86 year old  (1935), who is being seen today for an episodic care visit at: [facility] ([facility]) [269].   He has lived at this facility since 6/2019 and we assumed primary care in 3/2022.   Medical history significant for dementia, diabetes type 2 with neuropathy, HTN, lumbar radiculopathy, osteoarthritis, GERD,   depression. He has used an electric scooter for several years.     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 Patient reports symptoms are worse at times, but overall, unchanged. Reports a dry cough and hoarse voice for several days. It's not unusual for him to have a cough, but he feels it's slightly worse. No shortness of breath or chest pain. Denies feeling ill. Afebrile and no hypoxia. Good appetite. He's concerned about a small bump on his right ear and plans to see his dermatologist. Requires assist of 1 with transfers and cares.   Reports that he is moving to a different AL next week to be closer to his [place].       Allergies, and PMH/PSH reviewed in EPIC today    REVIEW OF SYSTEMS:  4 point ROS including Respiratory, CV, GI and , other than that noted in the HPI,  is negative    Objective:   /76   Pulse 80   Temp 97.6  F (36.4  C)   Resp 20   Ht 1.778 m (5' 10\")   Wt 86.5 kg (190 lb 12.8 oz)   SpO2 97%   BMI 27.38 kg/m    GENERAL APPEARANCE:  Alert, in no " distress  ENT:  [place], oropharynx clear   NECK: no adenopathy or  thyromegaly  RESP:  lungs clear to auscultation, no crackles or wheezes   CV:  regular rate and rhythm, no murmur,trace edema both LE   ABDOMEN:  soft, non-tender, no distension, no masses  M/S:  sitting in recliner. Weakness both LE. Good upper body strength. No joint inflammation  SKIN: small white dry bump on right pinna, no erythema. Mild erythema across both great toes, skin intact.   PSYCH:  oriented X 2- 3, memory impaired , affect and mood normal    Recent labs in [facility] reviewed by me today.     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 HgbA1c recently ordered, but not done. Severe neuropathy of both feet; previously  evaluated at [facility] and felt to be genetic and/or idiopathic.   Plan: continue Lyrica. Monitor blood glucose prn and follow HgbA1c (will not reorder HgbA1c as he is moving next week).     (F03.90) Dementia without behavioral disturbance, unspecified dementia type (H)  Comment: mild deficits with poor short term memory. Poor functional status due to comorbidities   Plan: AL staff assistance with cares, meals, activities, med admin.   Note that he will be moving to [facility] next week. Primary care will be assumed by the onsite team.   Discussed with staff.     (M54.16) Lumbar radiculopathy  (M15.9) Osteoarthritis of multiple joints, unspecified osteoarthritis type  Comment: nonambulatory for several years. He feels pain is adequately controlled and declines stronger pain meds   Plan: continue tylenol, Lyrica    (I10) Primary hypertension  Comment: controlled with BPs: 119/74, 140/81    Creatinine 1.04 on 3/22/2022  Plan: continue losartan. Avoid hypotension due to advanced age and fall risk     (F32.A) Depression, unspecified depression type  Comment:  well managed   Plan: continue sertraline and melatonin     Dry cough   Comment: lungs clear on exam and he does not appear acutely ill  Plan: swab for COVID-19. Monitor symptoms     (E03.9) Acquired hypothyroidism  Comment: TSH 1.93 on 11/2/2021  Plan: continue levothyroxine 75 mcg daily. Yearly TSH     Skin lesion right pinna  Comment: new lesion noted by patient  Plan: follow up with Derm     (E55.9) Vitamin D deficiency  Comment: vitamin D level 56 on 3/22/2022  Plan: continue vitamin D 1000 units daily     (E53.8) Vitamin B12 deficiency (non anemic)  Comment: vitamin B 12 level 490 on 3/22/2022, improved   Plan: continue B12 1000 mcg daily         Electronically signed by: REENA Loaiza CNP

## 2022-09-14 ENCOUNTER — LAB REQUISITION (OUTPATIENT)
Dept: LAB | Facility: CLINIC | Age: 87
End: 2022-09-14
Payer: COMMERCIAL

## 2022-09-14 ENCOUNTER — ASSISTED LIVING VISIT (OUTPATIENT)
Dept: GERIATRICS | Facility: CLINIC | Age: 87
End: 2022-09-14

## 2022-09-14 VITALS
DIASTOLIC BLOOD PRESSURE: 76 MMHG | HEIGHT: 70 IN | OXYGEN SATURATION: 97 % | BODY MASS INDEX: 27.32 KG/M2 | RESPIRATION RATE: 20 BRPM | SYSTOLIC BLOOD PRESSURE: 128 MMHG | TEMPERATURE: 97.6 F | WEIGHT: 190.8 LBS | HEART RATE: 80 BPM

## 2022-09-14 DIAGNOSIS — E03.9 ACQUIRED HYPOTHYROIDISM: ICD-10-CM

## 2022-09-14 DIAGNOSIS — E11.51 TYPE 2 DIABETES MELLITUS WITH PERIPHERAL ANGIOPATHY (H): ICD-10-CM

## 2022-09-14 DIAGNOSIS — M54.16 LUMBAR RADICULOPATHY: ICD-10-CM

## 2022-09-14 DIAGNOSIS — Z11.52 ENCOUNTER FOR SCREENING FOR COVID-19: ICD-10-CM

## 2022-09-14 DIAGNOSIS — E53.8 VITAMIN B12 DEFICIENCY (NON ANEMIC): ICD-10-CM

## 2022-09-14 DIAGNOSIS — I10 PRIMARY HYPERTENSION: ICD-10-CM

## 2022-09-14 DIAGNOSIS — E55.9 VITAMIN D DEFICIENCY: ICD-10-CM

## 2022-09-14 DIAGNOSIS — F32.A DEPRESSION, UNSPECIFIED DEPRESSION TYPE: ICD-10-CM

## 2022-09-14 DIAGNOSIS — M15.9 OSTEOARTHRITIS OF MULTIPLE JOINTS, UNSPECIFIED OSTEOARTHRITIS TYPE: ICD-10-CM

## 2022-09-14 DIAGNOSIS — F03.90 DEMENTIA WITHOUT BEHAVIORAL DISTURBANCE, UNSPECIFIED DEMENTIA TYPE: ICD-10-CM

## 2022-09-14 DIAGNOSIS — E11.42 DIABETIC POLYNEUROPATHY ASSOCIATED WITH TYPE 2 DIABETES MELLITUS (H): Primary | ICD-10-CM

## 2022-09-14 PROBLEM — D22.9 ATYPICAL NEVI: Status: ACTIVE | Noted: 2020-02-27

## 2022-09-14 PROBLEM — E11.40 DIABETIC NEUROPATHY (H): Status: ACTIVE | Noted: 2022-09-14

## 2022-09-14 PROBLEM — D18.03 LIVER HEMANGIOMA: Status: ACTIVE | Noted: 2019-06-28

## 2022-09-14 PROBLEM — E66.811 OBESITY, CLASS I, BMI 30-34.9: Status: ACTIVE | Noted: 2021-04-20

## 2022-09-14 PROBLEM — C44.90 MALIGNANT NEOPLASM OF SKIN: Status: ACTIVE | Noted: 2020-02-27

## 2022-09-14 PROCEDURE — U0003 INFECTIOUS AGENT DETECTION BY NUCLEIC ACID (DNA OR RNA); SEVERE ACUTE RESPIRATORY SYNDROME CORONAVIRUS 2 (SARS-COV-2) (CORONAVIRUS DISEASE [COVID-19]), AMPLIFIED PROBE TECHNIQUE, MAKING USE OF HIGH THROUGHPUT TECHNOLOGIES AS DESCRIBED BY CMS-2020-01-R: HCPCS | Mod: ORL | Performed by: NURSE PRACTITIONER

## 2022-09-14 NOTE — LETTER
"    9/14/2022        RE: Eduardo Cowan  [facility]  [address] [address]        [facility] GERIATRICS    Chief Complaint   Patient presents with     RECHECK     HPI:  Eduardo Cowan is a 86 year old  (1935), who is being seen today for an episodic care visit at: [facility] ([facility]) [[id number]].   He has lived at this facility since 6/2019 and we assumed primary care in 3/2022.   Medical history significant for dementia, diabetes type 2 with neuropathy, HTN, lumbar radiculopathy, osteoarthritis, GERD,   depression. He has used an electric scooter for several years.     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 Patient reports symptoms are worse at times, but overall, unchanged. Reports a dry cough and hoarse voice for several days. It's not unusual for him to have a cough, but he feels it's slightly worse. No shortness of breath or chest pain. Denies feeling ill. Afebrile and no hypoxia. Good appetite. He's concerned about a small bump on his right ear and plans to see his dermatologist. Requires assist of 1 with transfers and cares.   Reports that he is moving to a different [place] next week to be closer to his [employer].       Allergies, and PMH/PSH reviewed in EPIC today    REVIEW OF SYSTEMS:  4 point ROS including Respiratory, CV, GI and , other than that noted in the HPI,  is negative    Objective:   /76   Pulse 80   Temp 97.6  F (36.4  C)   Resp 20   Ht 1.778 m (5' 10\") "   Wt 86.5 kg (190 lb 12.8 oz)   SpO2 97%   BMI 27.38 kg/m    GENERAL APPEARANCE:  Alert, in no distress  ENT:  [place], oropharynx clear   NECK: no adenopathy or  thyromegaly  RESP:  lungs clear to auscultation, no crackles or wheezes   CV:  regular rate and rhythm, no murmur,trace edema both LE   ABDOMEN:  soft, non-tender, no distension, no masses  M/S:  sitting in recliner. Weakness both LE. Good upper body strength. No joint inflammation  SKIN: small white dry bump on right pinna, no erythema. Mild erythema across both great toes, skin intact.   PSYCH:  oriented X 2- 3, memory impaired , affect and mood normal    Recent labs in [facility] reviewed by me today.     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 HgbA1c recently ordered, but not done. Severe neuropathy of both feet; previously  evaluated at [place] and felt to be genetic and/or idiopathic.   Plan: continue Lyrica. Monitor blood glucose prn and follow HgbA1c (will not reorder HgbA1c as he is moving next week).     (F03.90) Dementia without behavioral disturbance, unspecified dementia type (H)  Comment: mild deficits with poor short term memory. Poor functional status due to comorbidities   Plan: AL staff assistance with cares, meals, activities, med admin.   Note that he will be moving to [facility] next week. Primary care will be assumed by the onsite team.   Discussed with staff.     (M54.16) Lumbar radiculopathy  (M15.9) Osteoarthritis of multiple joints, unspecified osteoarthritis type  Comment: nonambulatory for several years. He feels pain is adequately controlled and declines stronger pain meds   Plan: continue tylenol, Lyrica    (I10) Primary hypertension  Comment: controlled with BPs: 119/74, 140/81    Creatinine 1.04 on 3/22/2022  Plan: continue losartan. Avoid  hypotension due to advanced age and fall risk     (F32.A) Depression, unspecified depression type  Comment: well managed   Plan: continue sertraline and melatonin     Dry cough   Comment: lungs clear on exam and he does not appear acutely ill  Plan: swab for COVID-19. Monitor symptoms     (E03.9) Acquired hypothyroidism  Comment: TSH 1.93 on 11/2/2021  Plan: continue levothyroxine 75 mcg daily. Yearly TSH     Skin lesion right pinna  Comment: new lesion noted by patient  Plan: follow up with Derm     (E55.9) Vitamin D deficiency  Comment: vitamin D level 56 on 3/22/2022  Plan: continue vitamin D 1000 units daily     (E53.8) Vitamin B12 deficiency (non anemic)  Comment: vitamin B 12 level 490 on 3/22/2022, improved   Plan: continue B12 1000 mcg daily         Electronically signed by: REENA Loaiza CNP           Sincerely,        REENA Loaiza CNP

## 2022-09-15 LAB — SARS-COV-2 RNA RESP QL NAA+PROBE: NEGATIVE

## 2022-09-16 RX ORDER — VITAMIN B COMPLEX
TABLET ORAL DAILY
COMMUNITY

## 2022-11-29 ENCOUNTER — LAB REQUISITION (OUTPATIENT)
Dept: LAB | Facility: CLINIC | Age: 87
End: 2022-11-29
Payer: COMMERCIAL

## 2022-11-29 DIAGNOSIS — E11.9 TYPE 2 DIABETES MELLITUS WITHOUT COMPLICATIONS (H): ICD-10-CM

## 2022-11-29 DIAGNOSIS — Z87.11 PERSONAL HISTORY OF PEPTIC ULCER DISEASE: ICD-10-CM

## 2022-11-30 LAB
ERYTHROCYTE [DISTWIDTH] IN BLOOD BY AUTOMATED COUNT: 14.9 % (ref 10–15)
FERRITIN SERPL-MCNC: 335 NG/ML (ref 31–409)
HBA1C MFR BLD: 7.3 %
HCT VFR BLD AUTO: 37.8 % (ref 40–53)
HGB BLD-MCNC: 11.9 G/DL (ref 13.3–17.7)
MCH RBC QN AUTO: 29.4 PG (ref 26.5–33)
MCHC RBC AUTO-ENTMCNC: 31.5 G/DL (ref 31.5–36.5)
MCV RBC AUTO: 93 FL (ref 78–100)
PLATELET # BLD AUTO: 137 10E3/UL (ref 150–450)
RBC # BLD AUTO: 4.05 10E6/UL (ref 4.4–5.9)
WBC # BLD AUTO: 3.3 10E3/UL (ref 4–11)

## 2022-11-30 PROCEDURE — P9603 ONE-WAY ALLOW PRORATED MILES: HCPCS | Mod: ORL | Performed by: FAMILY MEDICINE

## 2022-11-30 PROCEDURE — 36415 COLL VENOUS BLD VENIPUNCTURE: CPT | Mod: ORL | Performed by: FAMILY MEDICINE

## 2022-11-30 PROCEDURE — 85027 COMPLETE CBC AUTOMATED: CPT | Mod: ORL | Performed by: FAMILY MEDICINE

## 2022-11-30 PROCEDURE — 82728 ASSAY OF FERRITIN: CPT | Mod: ORL | Performed by: FAMILY MEDICINE

## 2022-11-30 PROCEDURE — 83036 HEMOGLOBIN GLYCOSYLATED A1C: CPT | Mod: ORL | Performed by: FAMILY MEDICINE

## 2022-12-01 ENCOUNTER — PATIENT OUTREACH (OUTPATIENT)
Dept: GERIATRIC MEDICINE | Facility: CLINIC | Age: 87
End: 2022-12-01

## 2023-01-01 ENCOUNTER — LAB REQUISITION (OUTPATIENT)
Dept: LAB | Facility: CLINIC | Age: 88
End: 2023-01-01
Payer: COMMERCIAL

## 2023-01-01 ENCOUNTER — HEALTH MAINTENANCE LETTER (OUTPATIENT)
Age: 88
End: 2023-01-01

## 2023-01-01 DIAGNOSIS — E11.49 TYPE 2 DIABETES MELLITUS WITH OTHER DIABETIC NEUROLOGICAL COMPLICATION (H): ICD-10-CM

## 2023-01-01 DIAGNOSIS — E55.9 VITAMIN D DEFICIENCY, UNSPECIFIED: ICD-10-CM

## 2023-01-01 DIAGNOSIS — E03.9 HYPOTHYROIDISM, UNSPECIFIED: ICD-10-CM

## 2023-01-01 DIAGNOSIS — D64.9 ANEMIA, UNSPECIFIED: ICD-10-CM

## 2023-01-01 DIAGNOSIS — I10 ESSENTIAL (PRIMARY) HYPERTENSION: ICD-10-CM

## 2023-01-01 LAB
ALBUMIN SERPL BCG-MCNC: 3.7 G/DL (ref 3.5–5.2)
ALP SERPL-CCNC: 59 U/L (ref 40–150)
ALT SERPL W P-5'-P-CCNC: 13 U/L (ref 0–70)
ANION GAP SERPL CALCULATED.3IONS-SCNC: 10 MMOL/L (ref 7–15)
ANION GAP SERPL CALCULATED.3IONS-SCNC: 13 MMOL/L (ref 7–15)
ANION GAP SERPL CALCULATED.3IONS-SCNC: 13 MMOL/L (ref 7–15)
ANION GAP SERPL CALCULATED.3IONS-SCNC: 14 MMOL/L (ref 7–15)
AST SERPL W P-5'-P-CCNC: 14 U/L (ref 0–45)
BILIRUB SERPL-MCNC: 0.6 MG/DL
BUN SERPL-MCNC: 16.3 MG/DL (ref 8–23)
BUN SERPL-MCNC: 17.3 MG/DL (ref 8–23)
BUN SERPL-MCNC: 20.4 MG/DL (ref 8–23)
BUN SERPL-MCNC: 22.4 MG/DL (ref 8–23)
CALCIUM SERPL-MCNC: 9.2 MG/DL (ref 8.8–10.2)
CALCIUM SERPL-MCNC: 9.3 MG/DL (ref 8.8–10.2)
CALCIUM SERPL-MCNC: 9.5 MG/DL (ref 8.8–10.2)
CALCIUM SERPL-MCNC: 9.7 MG/DL (ref 8.8–10.2)
CHLORIDE SERPL-SCNC: 102 MMOL/L (ref 98–107)
CHLORIDE SERPL-SCNC: 102 MMOL/L (ref 98–107)
CHLORIDE SERPL-SCNC: 104 MMOL/L (ref 98–107)
CHLORIDE SERPL-SCNC: 105 MMOL/L (ref 98–107)
CREAT SERPL-MCNC: 0.99 MG/DL (ref 0.67–1.17)
CREAT SERPL-MCNC: 1 MG/DL (ref 0.67–1.17)
CREAT SERPL-MCNC: 1.02 MG/DL (ref 0.67–1.17)
CREAT SERPL-MCNC: 1.06 MG/DL (ref 0.67–1.17)
DEPRECATED CALCIDIOL+CALCIFEROL SERPL-MC: 31 UG/L (ref 20–75)
DEPRECATED HCO3 PLAS-SCNC: 22 MMOL/L (ref 22–29)
DEPRECATED HCO3 PLAS-SCNC: 24 MMOL/L (ref 22–29)
DEPRECATED HCO3 PLAS-SCNC: 26 MMOL/L (ref 22–29)
DEPRECATED HCO3 PLAS-SCNC: 26 MMOL/L (ref 22–29)
EGFRCR SERPLBLD CKD-EPI 2021: 68 ML/MIN/1.73M2
EGFRCR SERPLBLD CKD-EPI 2021: 73 ML/MIN/1.73M2
ERYTHROCYTE [DISTWIDTH] IN BLOOD BY AUTOMATED COUNT: 14.6 % (ref 10–15)
ERYTHROCYTE [DISTWIDTH] IN BLOOD BY AUTOMATED COUNT: 15.3 % (ref 10–15)
ERYTHROCYTE [DISTWIDTH] IN BLOOD BY AUTOMATED COUNT: 16.4 % (ref 10–15)
ERYTHROCYTE [DISTWIDTH] IN BLOOD BY AUTOMATED COUNT: 16.8 % (ref 10–15)
FERRITIN SERPL-MCNC: 224 NG/ML (ref 31–409)
FERRITIN SERPL-MCNC: 345 NG/ML (ref 31–409)
FOLATE SERPL-MCNC: 10.3 NG/ML (ref 4.6–34.8)
GFR SERPL CREATININE-BSD FRML MDRD: 72 ML/MIN/1.73M2
GFR SERPL CREATININE-BSD FRML MDRD: 74 ML/MIN/1.73M2
GLUCOSE SERPL-MCNC: 130 MG/DL (ref 70–99)
GLUCOSE SERPL-MCNC: 132 MG/DL (ref 70–99)
GLUCOSE SERPL-MCNC: 189 MG/DL (ref 70–99)
GLUCOSE SERPL-MCNC: 244 MG/DL (ref 70–99)
HBA1C MFR BLD: 7 %
HBA1C MFR BLD: 7.4 %
HBA1C MFR BLD: 7.5 %
HCT VFR BLD AUTO: 31.6 % (ref 40–53)
HCT VFR BLD AUTO: 33.9 % (ref 40–53)
HCT VFR BLD AUTO: 37.3 % (ref 40–53)
HCT VFR BLD AUTO: 38.7 % (ref 40–53)
HGB BLD-MCNC: 10.2 G/DL (ref 13.3–17.7)
HGB BLD-MCNC: 10.6 G/DL (ref 13.3–17.7)
HGB BLD-MCNC: 11.5 G/DL (ref 13.3–17.7)
HGB BLD-MCNC: 12 G/DL (ref 13.3–17.7)
HGB BLD-MCNC: 9.5 G/DL (ref 13.3–17.7)
IRON BINDING CAPACITY (ROCHE): 277 UG/DL (ref 240–430)
IRON BINDING CAPACITY (ROCHE): 287 UG/DL (ref 240–430)
IRON SATN MFR SERPL: 21 % (ref 15–46)
IRON SATN MFR SERPL: 22 % (ref 15–46)
IRON SERPL-MCNC: 57 UG/DL (ref 61–157)
IRON SERPL-MCNC: 62 UG/DL (ref 61–157)
MCH RBC QN AUTO: 27.5 PG (ref 26.5–33)
MCH RBC QN AUTO: 27.8 PG (ref 26.5–33)
MCH RBC QN AUTO: 28.6 PG (ref 26.5–33)
MCH RBC QN AUTO: 29.4 PG (ref 26.5–33)
MCHC RBC AUTO-ENTMCNC: 30.1 G/DL (ref 31.5–36.5)
MCHC RBC AUTO-ENTMCNC: 30.1 G/DL (ref 31.5–36.5)
MCHC RBC AUTO-ENTMCNC: 30.8 G/DL (ref 31.5–36.5)
MCHC RBC AUTO-ENTMCNC: 31 G/DL (ref 31.5–36.5)
MCV RBC AUTO: 92 FL (ref 78–100)
MCV RBC AUTO: 95 FL (ref 78–100)
PLATELET # BLD AUTO: 109 10E3/UL (ref 150–450)
PLATELET # BLD AUTO: 114 10E3/UL (ref 150–450)
PLATELET # BLD AUTO: 131 10E3/UL (ref 150–450)
PLATELET # BLD AUTO: 182 10E3/UL (ref 150–450)
POTASSIUM SERPL-SCNC: 3.6 MMOL/L (ref 3.4–5.3)
POTASSIUM SERPL-SCNC: 4.4 MMOL/L (ref 3.4–5.3)
POTASSIUM SERPL-SCNC: 4.4 MMOL/L (ref 3.4–5.3)
POTASSIUM SERPL-SCNC: 4.5 MMOL/L (ref 3.4–5.3)
PROT SERPL-MCNC: 8 G/DL (ref 6.4–8.3)
RBC # BLD AUTO: 3.45 10E6/UL (ref 4.4–5.9)
RBC # BLD AUTO: 3.67 10E6/UL (ref 4.4–5.9)
RBC # BLD AUTO: 3.91 10E6/UL (ref 4.4–5.9)
RBC # BLD AUTO: 4.2 10E6/UL (ref 4.4–5.9)
SODIUM SERPL-SCNC: 139 MMOL/L (ref 135–145)
SODIUM SERPL-SCNC: 140 MMOL/L (ref 136–145)
SODIUM SERPL-SCNC: 140 MMOL/L (ref 136–145)
SODIUM SERPL-SCNC: 141 MMOL/L (ref 136–145)
SODIUM SERPL-SCNC: 141 MMOL/L (ref 136–145)
TRANSFERRIN SERPL-MCNC: 338 MG/DL (ref 200–360)
TSH SERPL DL<=0.005 MIU/L-ACNC: 2.29 UIU/ML (ref 0.3–4.2)
TSH SERPL DL<=0.005 MIU/L-ACNC: 2.75 UIU/ML (ref 0.3–4.2)
TSH SERPL DL<=0.005 MIU/L-ACNC: 3.32 UIU/ML (ref 0.3–4.2)
VIT B12 SERPL-MCNC: 620 PG/ML (ref 232–1245)
VIT B12 SERPL-MCNC: 798 PG/ML (ref 232–1245)
WBC # BLD AUTO: 2 10E3/UL (ref 4–11)
WBC # BLD AUTO: 2.6 10E3/UL (ref 4–11)
WBC # BLD AUTO: 3.1 10E3/UL (ref 4–11)
WBC # BLD AUTO: 3.1 10E3/UL (ref 4–11)

## 2023-01-01 PROCEDURE — 84443 ASSAY THYROID STIM HORMONE: CPT | Mod: ORL | Performed by: NURSE PRACTITIONER

## 2023-01-01 PROCEDURE — 80053 COMPREHEN METABOLIC PANEL: CPT | Mod: ORL | Performed by: NURSE PRACTITIONER

## 2023-01-01 PROCEDURE — P9604 ONE-WAY ALLOW PRORATED TRIP: HCPCS | Mod: ORL | Performed by: NURSE PRACTITIONER

## 2023-01-01 PROCEDURE — 82306 VITAMIN D 25 HYDROXY: CPT | Mod: ORL | Performed by: NURSE PRACTITIONER

## 2023-01-01 PROCEDURE — 36415 COLL VENOUS BLD VENIPUNCTURE: CPT | Mod: ORL | Performed by: NURSE PRACTITIONER

## 2023-01-01 PROCEDURE — 83036 HEMOGLOBIN GLYCOSYLATED A1C: CPT | Mod: ORL | Performed by: NURSE PRACTITIONER

## 2023-01-01 PROCEDURE — 85027 COMPLETE CBC AUTOMATED: CPT | Mod: ORL | Performed by: NURSE PRACTITIONER

## 2023-01-01 PROCEDURE — 83550 IRON BINDING TEST: CPT | Mod: ORL | Performed by: NURSE PRACTITIONER

## 2023-01-01 PROCEDURE — P9603 ONE-WAY ALLOW PRORATED MILES: HCPCS | Mod: ORL | Performed by: INTERNAL MEDICINE

## 2023-01-01 PROCEDURE — 82746 ASSAY OF FOLIC ACID SERUM: CPT | Mod: ORL | Performed by: NURSE PRACTITIONER

## 2023-01-01 PROCEDURE — P9603 ONE-WAY ALLOW PRORATED MILES: HCPCS | Mod: ORL | Performed by: NURSE PRACTITIONER

## 2023-01-01 PROCEDURE — 82728 ASSAY OF FERRITIN: CPT | Mod: ORL | Performed by: NURSE PRACTITIONER

## 2023-01-01 PROCEDURE — 84295 ASSAY OF SERUM SODIUM: CPT | Mod: ORL | Performed by: INTERNAL MEDICINE

## 2023-01-01 PROCEDURE — 84466 ASSAY OF TRANSFERRIN: CPT | Mod: ORL | Performed by: NURSE PRACTITIONER

## 2023-01-01 PROCEDURE — 82607 VITAMIN B-12: CPT | Mod: ORL | Performed by: NURSE PRACTITIONER

## 2023-01-01 PROCEDURE — 36415 COLL VENOUS BLD VENIPUNCTURE: CPT | Mod: ORL | Performed by: INTERNAL MEDICINE

## 2023-01-01 PROCEDURE — 80048 BASIC METABOLIC PNL TOTAL CA: CPT | Mod: ORL | Performed by: NURSE PRACTITIONER

## 2023-01-01 PROCEDURE — 85018 HEMOGLOBIN: CPT | Mod: ORL | Performed by: NURSE PRACTITIONER

## 2023-01-09 NOTE — PROGRESS NOTES
[employer] Medicare Disenrollment    Member was disenrolled from [employer] effective: 12-1-22  Reason for disenrollment: Change in Care System   Bessie LIMA   [facility] Care Coordinator   [phone] - work cell phone   [phone] - uotn fax

## 2024-01-01 ENCOUNTER — LAB REQUISITION (OUTPATIENT)
Dept: LAB | Facility: CLINIC | Age: 89
End: 2024-01-01
Payer: COMMERCIAL

## 2024-01-01 DIAGNOSIS — J18.9 PNEUMONIA, UNSPECIFIED ORGANISM: ICD-10-CM

## 2024-01-01 LAB
ACANTHOCYTES BLD QL SMEAR: ABNORMAL
AST SERPL W P-5'-P-CCNC: 15 U/L (ref 0–45)
AUER BODIES BLD QL SMEAR: ABNORMAL
BASO STIPL BLD QL SMEAR: ABNORMAL
BASOPHILS # BLD AUTO: 0 10E3/UL (ref 0–0.2)
BASOPHILS NFR BLD AUTO: 0 %
BITE CELLS BLD QL SMEAR: ABNORMAL
BLISTER CELLS BLD QL SMEAR: ABNORMAL
BURR CELLS BLD QL SMEAR: ABNORMAL
CREAT SERPL-MCNC: 0.96 MG/DL (ref 0.67–1.17)
CRP SERPL-MCNC: 21.4 MG/L
DACRYOCYTES BLD QL SMEAR: ABNORMAL
EGFRCR SERPLBLD CKD-EPI 2021: 77 ML/MIN/1.73M2
ELLIPTOCYTES BLD QL SMEAR: ABNORMAL
EOSINOPHIL # BLD AUTO: 0.1 10E3/UL (ref 0–0.7)
EOSINOPHIL NFR BLD AUTO: 2 %
ERYTHROCYTE [DISTWIDTH] IN BLOOD BY AUTOMATED COUNT: 18.2 % (ref 10–15)
FRAGMENTS BLD QL SMEAR: ABNORMAL
HCT VFR BLD AUTO: 25.5 % (ref 40–53)
HGB BLD-MCNC: 7.7 G/DL (ref 13.3–17.7)
HGB C CRYSTALS: ABNORMAL
HOWELL-JOLLY BOD BLD QL SMEAR: ABNORMAL
IMM GRANULOCYTES # BLD: 0.1 10E3/UL
IMM GRANULOCYTES NFR BLD: 4 %
LYMPHOCYTES # BLD AUTO: 0.9 10E3/UL (ref 0.8–5.3)
LYMPHOCYTES NFR BLD AUTO: 39 %
MCH RBC QN AUTO: 26.7 PG (ref 26.5–33)
MCHC RBC AUTO-ENTMCNC: 30.2 G/DL (ref 31.5–36.5)
MCV RBC AUTO: 89 FL (ref 78–100)
MONOCYTES # BLD AUTO: 0.2 10E3/UL (ref 0–1.3)
MONOCYTES NFR BLD AUTO: 9 %
NEUTROPHILS # BLD AUTO: 1 10E3/UL (ref 1.6–8.3)
NEUTROPHILS NFR BLD AUTO: 46 %
NEUTS HYPERSEG BLD QL SMEAR: ABNORMAL
NRBC # BLD AUTO: 0 10E3/UL
NRBC BLD AUTO-RTO: 1 /100
PLAT MORPH BLD: ABNORMAL
PLATELET # BLD AUTO: 45 10E3/UL (ref 150–450)
POLYCHROMASIA BLD QL SMEAR: SLIGHT
RBC # BLD AUTO: 2.88 10E6/UL (ref 4.4–5.9)
RBC AGGLUT BLD QL: ABNORMAL
RBC MORPH BLD: ABNORMAL
ROULEAUX BLD QL SMEAR: ABNORMAL
SICKLE CELLS BLD QL SMEAR: ABNORMAL
SMUDGE CELLS BLD QL SMEAR: ABNORMAL
SPHEROCYTES BLD QL SMEAR: ABNORMAL
STOMATOCYTES BLD QL SMEAR: ABNORMAL
TARGETS BLD QL SMEAR: ABNORMAL
TOXIC GRANULES BLD QL SMEAR: ABNORMAL
VARIANT LYMPHS BLD QL SMEAR: ABNORMAL
WBC # BLD AUTO: 2.2 10E3/UL (ref 4–11)

## 2024-01-01 PROCEDURE — 84450 TRANSFERASE (AST) (SGOT): CPT | Mod: ORL | Performed by: INTERNAL MEDICINE

## 2024-01-01 PROCEDURE — 85025 COMPLETE CBC W/AUTO DIFF WBC: CPT | Mod: ORL | Performed by: INTERNAL MEDICINE

## 2024-01-01 PROCEDURE — 86140 C-REACTIVE PROTEIN: CPT | Mod: ORL | Performed by: INTERNAL MEDICINE

## 2024-01-01 PROCEDURE — P9604 ONE-WAY ALLOW PRORATED TRIP: HCPCS | Mod: ORL | Performed by: INTERNAL MEDICINE

## 2024-01-01 PROCEDURE — 36415 COLL VENOUS BLD VENIPUNCTURE: CPT | Mod: ORL | Performed by: INTERNAL MEDICINE

## 2024-01-01 PROCEDURE — 82565 ASSAY OF CREATININE: CPT | Mod: ORL | Performed by: INTERNAL MEDICINE
